# Patient Record
Sex: FEMALE | Race: WHITE | NOT HISPANIC OR LATINO | Employment: FULL TIME | URBAN - METROPOLITAN AREA
[De-identification: names, ages, dates, MRNs, and addresses within clinical notes are randomized per-mention and may not be internally consistent; named-entity substitution may affect disease eponyms.]

---

## 2018-12-18 ENCOUNTER — OFFICE VISIT CONVERTED (OUTPATIENT)
Dept: GASTROENTEROLOGY | Facility: CLINIC | Age: 18
End: 2018-12-18
Attending: PHYSICIAN ASSISTANT

## 2019-07-05 ENCOUNTER — HOSPITAL ENCOUNTER (OUTPATIENT)
Dept: OTHER | Facility: HOSPITAL | Age: 19
Discharge: HOME | End: 2019-07-05
Attending: INTERNAL MEDICINE

## 2019-07-05 LAB
BASOPHILS # BLD AUTO: 0.03 10*3/UL (ref 0–0.2)
BASOPHILS NFR BLD AUTO: 0.4 % (ref 0–3)
CONV ABS IMM GRAN: 0.01 10*3/UL (ref 0–0.2)
CONV IMMATURE GRAN: 0.1 % (ref 0–1.8)
DEPRECATED RDW RBC AUTO: 47.5 FL (ref 36.4–46.3)
EOSINOPHIL # BLD AUTO: 0.9 10*3/UL (ref 0–0.7)
EOSINOPHIL # BLD AUTO: 11 % (ref 0–7)
ERYTHROCYTE [DISTWIDTH] IN BLOOD BY AUTOMATED COUNT: 18.8 % (ref 11.7–14.4)
HBA1C MFR BLD: 9.3 G/DL (ref 12–16)
HCT VFR BLD AUTO: 35.1 % (ref 37–47)
IRON SATN MFR SERPL: 7 % (ref 20–55)
IRON SERPL-MCNC: 35 UG/DL (ref 60–170)
LYMPHOCYTES # BLD AUTO: 2.51 10*3/UL (ref 1–5)
MCH RBC QN AUTO: 18.7 PG (ref 27–31)
MCHC RBC AUTO-ENTMCNC: 26.5 G/DL (ref 33–37)
MCV RBC AUTO: 70.6 FL (ref 81–99)
MONOCYTES # BLD AUTO: 0.49 10*3/UL (ref 0.2–1.2)
MONOCYTES NFR BLD AUTO: 6 % (ref 3–10)
NEUTROPHILS # BLD AUTO: 4.25 10*3/UL (ref 2–8)
NEUTROPHILS NFR BLD AUTO: 51.9 % (ref 30–85)
NRBC CBCN: 0 % (ref 0–0.7)
PLATELET # BLD AUTO: 559 10*3/UL (ref 130–400)
PMV BLD AUTO: 9.9 FL (ref 9.4–12.3)
RBC # BLD AUTO: 4.97 10*6/UL (ref 4.2–5.4)
TIBC SERPL-MCNC: 535 UG/DL (ref 245–450)
TRANSFERRIN SERPL-MCNC: 374 MG/DL (ref 250–380)
VARIANT LYMPHS NFR BLD MANUAL: 30.6 % (ref 20–45)
WBC # BLD AUTO: 8.19 10*3/UL (ref 4.8–10.8)

## 2020-02-27 ENCOUNTER — HOSPITAL ENCOUNTER (OUTPATIENT)
Dept: OTHER | Facility: HOSPITAL | Age: 20
Discharge: HOME | End: 2020-02-27
Attending: PHYSICIAN ASSISTANT

## 2020-02-27 ENCOUNTER — OFFICE VISIT CONVERTED (OUTPATIENT)
Dept: GASTROENTEROLOGY | Facility: CLINIC | Age: 20
End: 2020-02-27
Attending: PHYSICIAN ASSISTANT

## 2020-02-27 LAB
ALBUMIN SERPL-MCNC: 4.1 G/DL (ref 3.5–5)
ALBUMIN/GLOB SERPL: 1.3 {RATIO} (ref 1.4–2.6)
ALP SERPL-CCNC: 81 U/L (ref 42–98)
ALT SERPL-CCNC: 18 U/L (ref 10–40)
ANION GAP SERPL CALC-SCNC: 16 MMOL/L (ref 8–19)
AST SERPL-CCNC: 17 U/L (ref 15–50)
BASOPHILS # BLD AUTO: 0.03 10*3/UL (ref 0–0.2)
BASOPHILS NFR BLD AUTO: 0.5 % (ref 0–3)
BILIRUB SERPL-MCNC: 0.41 MG/DL (ref 0.2–1.3)
BUN SERPL-MCNC: 9 MG/DL (ref 5–25)
BUN/CREAT SERPL: 11 {RATIO} (ref 6–20)
CALCIUM SERPL-MCNC: 8.8 MG/DL (ref 8.7–10.4)
CHLORIDE SERPL-SCNC: 103 MMOL/L (ref 99–111)
CONV ABS IMM GRAN: 0.01 10*3/UL (ref 0–0.2)
CONV CO2: 23 MMOL/L (ref 22–32)
CONV IMMATURE GRAN: 0.2 % (ref 0–1.8)
CONV TOTAL PROTEIN: 7.3 G/DL (ref 6.3–8.2)
CREAT UR-MCNC: 0.8 MG/DL (ref 0.5–0.9)
DEPRECATED RDW RBC AUTO: 40.6 FL (ref 36.4–46.3)
EOSINOPHIL # BLD AUTO: 0.2 10*3/UL (ref 0–0.7)
EOSINOPHIL # BLD AUTO: 3.6 % (ref 0–7)
ERYTHROCYTE [DISTWIDTH] IN BLOOD BY AUTOMATED COUNT: 13 % (ref 11.7–14.4)
GFR SERPLBLD BASED ON 1.73 SQ M-ARVRAT: >60 ML/MIN/{1.73_M2}
GLOBULIN UR ELPH-MCNC: 3.2 G/DL (ref 2–3.5)
GLUCOSE SERPL-MCNC: 97 MG/DL (ref 65–99)
HCT VFR BLD AUTO: 41.5 % (ref 37–47)
HGB BLD-MCNC: 13.3 G/DL (ref 12–16)
IRON SATN MFR SERPL: 9 % (ref 20–55)
IRON SERPL-MCNC: 41 UG/DL (ref 60–170)
LYMPHOCYTES # BLD AUTO: 2.11 10*3/UL (ref 1–5)
LYMPHOCYTES NFR BLD AUTO: 37.5 % (ref 20–45)
MCH RBC QN AUTO: 27.9 PG (ref 27–31)
MCHC RBC AUTO-ENTMCNC: 32 G/DL (ref 33–37)
MCV RBC AUTO: 87 FL (ref 81–99)
MONOCYTES # BLD AUTO: 0.21 10*3/UL (ref 0.2–1.2)
MONOCYTES NFR BLD AUTO: 3.7 % (ref 3–10)
NEUTROPHILS # BLD AUTO: 3.07 10*3/UL (ref 2–8)
NEUTROPHILS NFR BLD AUTO: 54.5 % (ref 30–85)
NRBC CBCN: 0 % (ref 0–0.7)
OSMOLALITY SERPL CALC.SUM OF ELEC: 285 MOSM/KG (ref 273–304)
PLATELET # BLD AUTO: 341 10*3/UL (ref 130–400)
PMV BLD AUTO: 10.4 FL (ref 9.4–12.3)
POTASSIUM SERPL-SCNC: 4 MMOL/L (ref 3.5–5.3)
RBC # BLD AUTO: 4.77 10*6/UL (ref 4.2–5.4)
SODIUM SERPL-SCNC: 138 MMOL/L (ref 135–147)
TIBC SERPL-MCNC: 433 UG/DL (ref 245–450)
TRANSFERRIN SERPL-MCNC: 303 MG/DL (ref 250–380)
WBC # BLD AUTO: 5.63 10*3/UL (ref 4.8–10.8)

## 2020-12-11 ENCOUNTER — HOSPITAL ENCOUNTER (OUTPATIENT)
Dept: FAMILY MEDICINE CLINIC | Facility: CLINIC | Age: 20
Discharge: HOME | End: 2020-12-11
Attending: NURSE PRACTITIONER

## 2020-12-11 ENCOUNTER — OFFICE VISIT CONVERTED (OUTPATIENT)
Dept: FAMILY MEDICINE CLINIC | Facility: CLINIC | Age: 20
End: 2020-12-11
Attending: NURSE PRACTITIONER

## 2020-12-11 LAB
ALBUMIN SERPL-MCNC: 4 G/DL (ref 3.5–5)
ALBUMIN/GLOB SERPL: 1 {RATIO} (ref 1.4–2.6)
ALP SERPL-CCNC: 96 U/L (ref 42–98)
ALT SERPL-CCNC: 18 U/L (ref 10–40)
ANION GAP SERPL CALC-SCNC: 13 MMOL/L (ref 8–19)
AST SERPL-CCNC: 20 U/L (ref 15–50)
BASOPHILS # BLD AUTO: 0.03 10*3/UL (ref 0–0.2)
BASOPHILS NFR BLD AUTO: 0.2 % (ref 0–3)
BILIRUB SERPL-MCNC: 0.37 MG/DL (ref 0.2–1.3)
BUN SERPL-MCNC: 8 MG/DL (ref 5–25)
BUN/CREAT SERPL: 10 {RATIO} (ref 6–20)
CALCIUM SERPL-MCNC: 9.3 MG/DL (ref 8.7–10.4)
CHLORIDE SERPL-SCNC: 102 MMOL/L (ref 99–111)
CONV ABS IMM GRAN: 0.06 10*3/UL (ref 0–0.2)
CONV CO2: 25 MMOL/L (ref 22–32)
CONV IMMATURE GRAN: 0.5 % (ref 0–1.8)
CONV TOTAL PROTEIN: 8.2 G/DL (ref 6.3–8.2)
CREAT UR-MCNC: 0.81 MG/DL (ref 0.5–0.9)
DEPRECATED RDW RBC AUTO: 46.1 FL (ref 36.4–46.3)
EOSINOPHIL # BLD AUTO: 1.62 10*3/UL (ref 0–0.7)
EOSINOPHIL # BLD AUTO: 12.9 % (ref 0–7)
ERYTHROCYTE [DISTWIDTH] IN BLOOD BY AUTOMATED COUNT: 13.9 % (ref 11.7–14.4)
FERRITIN SERPL-MCNC: 23 NG/ML (ref 10–200)
GFR SERPLBLD BASED ON 1.73 SQ M-ARVRAT: >60 ML/MIN/{1.73_M2}
GLOBULIN UR ELPH-MCNC: 4.2 G/DL (ref 2–3.5)
GLUCOSE SERPL-MCNC: 97 MG/DL (ref 65–99)
HCT VFR BLD AUTO: 41.7 % (ref 37–47)
HGB BLD-MCNC: 12.9 G/DL (ref 12–16)
IRON SATN MFR SERPL: 31 % (ref 20–55)
IRON SERPL-MCNC: 145 UG/DL (ref 60–170)
LYMPHOCYTES # BLD AUTO: 2.31 10*3/UL (ref 1–5)
LYMPHOCYTES NFR BLD AUTO: 18.4 % (ref 20–45)
MCH RBC QN AUTO: 28.2 PG (ref 27–31)
MCHC RBC AUTO-ENTMCNC: 30.9 G/DL (ref 33–37)
MCV RBC AUTO: 91 FL (ref 81–99)
MONOCYTES # BLD AUTO: 0.46 10*3/UL (ref 0.2–1.2)
MONOCYTES NFR BLD AUTO: 3.7 % (ref 3–10)
NEUTROPHILS # BLD AUTO: 8.07 10*3/UL (ref 2–8)
NEUTROPHILS NFR BLD AUTO: 64.3 % (ref 30–85)
NRBC CBCN: 0 % (ref 0–0.7)
OSMOLALITY SERPL CALC.SUM OF ELEC: 280 MOSM/KG (ref 273–304)
PLATELET # BLD AUTO: 453 10*3/UL (ref 130–400)
PMV BLD AUTO: 9.8 FL (ref 9.4–12.3)
POTASSIUM SERPL-SCNC: 4.4 MMOL/L (ref 3.5–5.3)
RBC # BLD AUTO: 4.58 10*6/UL (ref 4.2–5.4)
SODIUM SERPL-SCNC: 136 MMOL/L (ref 135–147)
T4 FREE SERPL-MCNC: 1.2 NG/DL (ref 0.9–1.8)
TIBC SERPL-MCNC: 469 UG/DL (ref 245–450)
TRANSFERRIN SERPL-MCNC: 328 MG/DL (ref 250–380)
TSH SERPL-ACNC: 4.53 M[IU]/L (ref 0.27–4.2)
WBC # BLD AUTO: 12.55 10*3/UL (ref 4.8–10.8)

## 2021-01-11 ENCOUNTER — TELEMEDICINE CONVERTED (OUTPATIENT)
Dept: FAMILY MEDICINE CLINIC | Facility: CLINIC | Age: 21
End: 2021-01-11
Attending: NURSE PRACTITIONER

## 2021-03-01 ENCOUNTER — CONVERSION ENCOUNTER (OUTPATIENT)
Dept: GASTROENTEROLOGY | Facility: CLINIC | Age: 21
End: 2021-03-01

## 2021-03-01 ENCOUNTER — OFFICE VISIT CONVERTED (OUTPATIENT)
Dept: GASTROENTEROLOGY | Facility: CLINIC | Age: 21
End: 2021-03-01
Attending: NURSE PRACTITIONER

## 2021-04-16 ENCOUNTER — HOSPITAL ENCOUNTER (OUTPATIENT)
Dept: GASTROENTEROLOGY | Facility: HOSPITAL | Age: 21
Setting detail: HOSPITAL OUTPATIENT SURGERY
Discharge: HOME | End: 2021-04-16
Attending: INTERNAL MEDICINE

## 2021-04-16 LAB — HCG UR QL: NEGATIVE

## 2021-05-01 ENCOUNTER — HOSPITAL ENCOUNTER (OUTPATIENT)
Dept: OTHER | Facility: HOSPITAL | Age: 21
Discharge: HOME | End: 2021-05-01
Attending: INTERNAL MEDICINE

## 2021-05-01 LAB
ALBUMIN SERPL-MCNC: 3.8 G/DL (ref 3.5–5)
ALBUMIN/GLOB SERPL: 1.2 {RATIO} (ref 1.4–2.6)
ALP SERPL-CCNC: 86 U/L (ref 42–98)
ALT SERPL-CCNC: 14 U/L (ref 10–40)
ANION GAP SERPL CALC-SCNC: 11 MMOL/L (ref 8–19)
AST SERPL-CCNC: 17 U/L (ref 15–50)
BASOPHILS # BLD AUTO: 0.02 10*3/UL (ref 0–0.2)
BASOPHILS NFR BLD AUTO: 0.2 % (ref 0–3)
BILIRUB SERPL-MCNC: 0.34 MG/DL (ref 0.2–1.3)
BUN SERPL-MCNC: 8 MG/DL (ref 5–25)
BUN/CREAT SERPL: 13 {RATIO} (ref 6–20)
CALCIUM SERPL-MCNC: 8.8 MG/DL (ref 8.7–10.4)
CHLORIDE SERPL-SCNC: 104 MMOL/L (ref 99–111)
CONV ABS IMM GRAN: 0.03 10*3/UL (ref 0–0.2)
CONV CO2: 22 MMOL/L (ref 22–32)
CONV IMMATURE GRAN: 0.3 % (ref 0–1.8)
CONV TOTAL PROTEIN: 7.1 G/DL (ref 6.3–8.2)
CREAT UR-MCNC: 0.62 MG/DL (ref 0.5–0.9)
DEPRECATED RDW RBC AUTO: 41.8 FL (ref 36.4–46.3)
EOSINOPHIL # BLD AUTO: 0.57 10*3/UL (ref 0–0.7)
EOSINOPHIL # BLD AUTO: 6.3 % (ref 0–7)
ERYTHROCYTE [DISTWIDTH] IN BLOOD BY AUTOMATED COUNT: 13.3 % (ref 11.7–14.4)
GFR SERPLBLD BASED ON 1.73 SQ M-ARVRAT: >60 ML/MIN/{1.73_M2}
GLOBULIN UR ELPH-MCNC: 3.3 G/DL (ref 2–3.5)
GLUCOSE SERPL-MCNC: 95 MG/DL (ref 65–99)
HCT VFR BLD AUTO: 40.5 % (ref 37–47)
HGB BLD-MCNC: 13.4 G/DL (ref 12–16)
LYMPHOCYTES # BLD AUTO: 1.96 10*3/UL (ref 1–5)
LYMPHOCYTES NFR BLD AUTO: 21.8 % (ref 20–45)
MCH RBC QN AUTO: 28.7 PG (ref 27–31)
MCHC RBC AUTO-ENTMCNC: 33.1 G/DL (ref 33–37)
MCV RBC AUTO: 86.7 FL (ref 81–99)
MONOCYTES # BLD AUTO: 0.41 10*3/UL (ref 0.2–1.2)
MONOCYTES NFR BLD AUTO: 4.6 % (ref 3–10)
NEUTROPHILS # BLD AUTO: 6 10*3/UL (ref 2–8)
NEUTROPHILS NFR BLD AUTO: 66.8 % (ref 30–85)
NRBC CBCN: 0 % (ref 0–0.7)
OSMOLALITY SERPL CALC.SUM OF ELEC: 274 MOSM/KG (ref 273–304)
PLATELET # BLD AUTO: 371 10*3/UL (ref 130–400)
PMV BLD AUTO: 9.5 FL (ref 9.4–12.3)
POTASSIUM SERPL-SCNC: 4.4 MMOL/L (ref 3.5–5.3)
RBC # BLD AUTO: 4.67 10*6/UL (ref 4.2–5.4)
SODIUM SERPL-SCNC: 133 MMOL/L (ref 135–147)
WBC # BLD AUTO: 8.99 10*3/UL (ref 4.8–10.8)

## 2021-05-10 NOTE — H&P
History and Physical      Patient Name: Lyn Darling   Patient ID: [id number]   Sex: Female   YOB: 2000    Primary Care Provider: Marilia Duffy MD    Visit Date: December 11, 2020    Provider: CELIA Lester   Location: [facility]   Location Address: [address] [place] [address], [address] [place] [address]  [id number]   Location Phone: [phone]          Chief Complaint  · new pt  · wants to talk about anxiety      History Of Present Illness  Lyn Darling is a 20 year old /White female who presents for evaluation and treatment of:      She is here as a new patient today to establish care.  She would like to talk about anxiety.  She has a history of anxiety and states she had been on Zoloft in the past when she was a teenager.  She states she did well with it.  She is having more anxiety lately and would like to restart some medication.    She has a history of ulcerative colitis: She follows with Dr. Guzman, gastroenterology and is stable on mesalamine.    She states she has a history of iron deficiency anemia so she takes over-the-counter iron tablet daily.  She states she was told to continue taking it daily.    She has history of allergies and takes Allegra.       Past Medical History  Disease Name Date Onset Notes   Anemia --  --    Anxiety --  --    Colitis --  --    Diarrhea --  --    Eczema --  --    Kidney stones --  --    Ulcerative colitis --  --          Past Surgical History  Procedure Name Date Notes   Colonoscopy 2015 --          Medication List  Name Date Started Instructions   Allegra Allergy oral  --    iron oral  take 1 by oral route daily   mesalamine 0.375 gram oral capsule,extended release 24hr 08/19/2020 TAKE FOUR CAPSULES BY MOUTH EVERY MORNING         Allergy List  Allergen Name Date Reaction Notes   NO KNOWN DRUG ALLERGIES --  --  --          Family Medical History  Disease Name Relative/Age Notes   Colon Neoplasm, Malignant Grandfather  "(maternal)/60   --          Social History  Finding Status Start/Stop Quantity Notes   Alcohol Never --/-- --  --    Tobacco Never --/-- --  --          Review of Systems  · Constitutional  o Denies  o : fever, fatigue, weight loss, weight gain  · Cardiovascular  o Denies  o : lower extremity edema, claudication, chest pressure, palpitations  · Respiratory  o Denies  o : shortness of breath, wheezing, cough, hemoptysis, dyspnea on exertion  · Gastrointestinal  o Denies  o : nausea, vomiting, constipation  · Genitourinary  o Denies  o : urgency, frequency  · Integument  o Denies  o : rash, itching  · Neurologic  o Denies  o : altered mental status, tingling or numbness  · Musculoskeletal  o Denies  o : joint pain, joint swelling  · Endocrine  o Denies  o : polyuria, polydipsia, cold intolerance  · Psychiatric  o Admits  o : anxiety  o Denies  o : depression, suicidal ideation, homicidal ideation  · Allergic-Immunologic  o Admits  o : sinus allergy symptoms  o Denies  o : frequent illnesses      Vitals  Date Time BP Position Site L\R Cuff Size HR RR TEMP (F) WT  HT  BMI kg/m2 BSA m2 O2 Sat FR L/min FiO2 HC       12/11/2020 08:24 /62 Sitting    105 - R  98.1 200lbs 0oz 5'  5\" 33.28 2.04 99 %            Physical Examination  · Constitutional  o Appearance  o : no acute distress, well-nourished  · Head and Face  o Head  o :   § Inspection  § : atraumatic, normocephalic  · Neck  o Thyroid  o : gland size normal, nontender, no nodules or masses present on palpation, symmetric  · Respiratory  o Respiratory Effort  o : breathing comfortably, symmetric chest rise  o Auscultation of Lungs  o : clear to asculatation bilaterally, no wheezes, rales, or rhonchii  · Cardiovascular  o Heart  o :   § Auscultation of Heart  § : regular rate and rhythm, no murmurs, rubs, or gallops  o Peripheral Vascular System  o :   § Extremities  § : no edema  · Lymphatic  o Neck  o : no lymphadenopathy present  · Neurologic  o Mental Status " Examination  o :   § Orientation  § : grossly oriented to person, place and time  o Gait and Station  o :   § Gait Screening  § : normal gait  · Psychiatric  o Mood and Affect  o : mood normal, affect appropriate  o Presence of Abnormal Thoughts  o : no hallucinations, no delusions present, no psychotic thoughts, no homicidal ideation, no suicidal ideation, no evidence of obsessional thinking          Assessment  · Iron deficiency anemia, unspecified iron deficiency anemia type     280.9/D50.9  · Anxiety disorder     300.00/F41.9  We will start her on Zoloft 50 mg once daily, advised to start with a half a tablet daily for 1 week and then increase to full tablet. I will have her follow-up in a month see how she is doing and see if we need to adjust dose.  · Ulcerative colitis     556.9/K51.90  We will check labs today, will call with results. We will also send results to Dr. Cottrell.      Plan  · Orders  o CBC with Auto Diff [facility] (06219) - 300.00/F41.9, 556.9/K51.90, 280.9/D50.9 - 12/11/2020   send results to Dr. MARTHA Guzman  o Iron panel (iron, TIBC, transferrin saturation) (01265, 45435, 61109) - 300.00/F41.9, 556.9/K51.90, 280.9/D50.9 - 12/11/2020  o Ferritin ser/plas (24189) - 300.00/F41.9, 556.9/K51.90, 280.9/D50.9 - 12/11/2020  o CMP [facility] (47090) - 300.00/F41.9, 556.9/K51.90, 280.9/D50.9 - 12/11/2020  o Thyroid Profile (58144, THYII, 73944) - 300.00/F41.9, 556.9/K51.90, 280.9/D50.9 - 12/11/2020  o ACO-39: Current medications updated and reviewed (, 1159F) - - 12/11/2020  o ACO-18: Negative screen for clinical depression using a standardized tool () - - 12/11/2020   4 pts  · Medications  o Zoloft 50 mg oral tablet   SIG: take 1 tablet (50 mg) by oral route once daily for 30 days   DISP: (30) Tablet with 5 refills  Prescribed on 12/11/2020     o Medications have been Reconciled  o Transition of Care or Provider Policy  · Instructions  o Patient was given an SSRI/SSNRI medication and warned of possible  side effects of the medication including potential for increased risk of suicidal thoughts and feelings. Patient was instructed that if they begin to exhibit any of these effects they will discontinue the medication immediately and contact our office or the ER ASAP.  o Patient was educated/instructed on their diagnosis, treatment and medications prior to discharge from the clinic today.  o Patient instructed to seek medical attention urgently for new or worsening symptoms.  o Call the office with any concerns or questions.  · Disposition  o Return to clinic in 4 weeks            Electronically Signed by: CELIA Lester -Author on December 11, 2020 09:16:17 AM

## 2021-05-14 VITALS
HEIGHT: 65 IN | HEART RATE: 105 BPM | BODY MASS INDEX: 33.32 KG/M2 | TEMPERATURE: 98.1 F | SYSTOLIC BLOOD PRESSURE: 124 MMHG | WEIGHT: 200 LBS | DIASTOLIC BLOOD PRESSURE: 62 MMHG | OXYGEN SATURATION: 99 %

## 2021-05-14 VITALS
DIASTOLIC BLOOD PRESSURE: 68 MMHG | BODY MASS INDEX: 32.99 KG/M2 | RESPIRATION RATE: 16 BRPM | WEIGHT: 198 LBS | HEART RATE: 98 BPM | OXYGEN SATURATION: 100 % | SYSTOLIC BLOOD PRESSURE: 122 MMHG | HEIGHT: 65 IN

## 2021-05-14 NOTE — PROGRESS NOTES
Progress Note      Patient Name: Lyn Darling   Patient ID: [id number]   Sex: Female   YOB: 2000    Primary Care Provider: Lidia YANG   Referring Provider: Lidia YANG    Visit Date: March 1, 2021    Provider: CELIA VASQUEZ   Location: [facility]   Location Address: [address] [place] [address]  [id number]   Location Phone: [phone]          Chief Complaint  · Follow-up      History Of Present Illness     20-year-old female with a history of pan ulcerative colitis presents to the office today for follow-up.  Patient was diagnosed with pan ulcerative colitis in 2015, she follows up annually.  Her ulcerative colitis has been maintained with Apriso 4 tabs daily.  She denies any rectal bleeding, abdominal pain, nausea, weight loss.  Patient reports that she did have a flare about 3 to 4 months ago that lasted about a week and resolved on its own.  Take any steroids.  Patient reports she is having 2 normal bowel movements a day and has not seen any blood or mucus.    Reviewed the patient's most recent colonoscopy 10/23/2015 performed by Dr. Guzman revealed moderately severe colitis.  Patient was not started on Apriso 0.375 g 4 tabs daily.  Biopsies were consistent with chronic colitis.     Labs: Review of labs 12/11/2020 revealed normal CMP, WBC12.55, RBC5.58, Hgb12.9, uhqkwjlnp686, eosinophils1.62, ydfb387, PKHW546, TSH4.53         Past Medical History  Anemia; Anxiety; Anxiety disorder; Eczema; Iron deficiency anemia, unspecified iron deficiency anemia type; Kidney stones; Ulcerative colitis         Past Surgical History  Colonoscopy         Medication List  Allegra Allergy oral; iron oral; mesalamine 0.375 gram oral capsule,extended release 24hr; Ortho Tri-Cyclen (28) 0.18/0.215/0.25 mg-35 mcg (28) oral tablet; Zoloft 50 mg oral tablet         Allergy List  NO KNOWN DRUG ALLERGIES         Family Medical History  Colon Neoplasm,  "Malignant         Social History  Alcohol (Never); Tobacco (Never)         Review of Systems  · Constitutional  o Denies  o : chills, fatigue, fever, loss of appetite, weakness, weight loss  · Cardiovascular  o Denies  o : chest pain  · Respiratory  o Denies  o : shortness of breath  · Gastrointestinal  o Denies  o : abdominal pain, blood in stools, bowel changes, constipation, diarrhea, heartburn, nausea, reflux, vomiting, dysphagia  · Endocrine  o Denies  o : weight gain, weight loss      Vitals  Date Time BP Position Site L\R Cuff Size HR RR TEMP (F) WT  HT  BMI kg/m2 BSA m2 O2 Sat FR L/min FiO2 HC       02/27/2020 08:08 /64 Sitting    82 - R 16  187lbs 0oz 5'  5\" 31.12 1.97 100 %      12/11/2020 08:24 /62 Sitting    105 - R  98.1 200lbs 0oz 5'  5\" 33.28 2.04 99 %      03/01/2021 09:15 /68 Sitting    98 - R 16  198lbs 0oz 5'  5\" 32.95 2.03 100 %            Physical Examination  · Constitutional  o Appearance  o : Healthy-appearing, awake and alert in no acute distress  · Head and Face  o Head  o : Normocephalic with no worriesome skin lesions  · Eyes  o Vision  o :   § Visual Fields  § : eyes move symmetrical in all directions  o Sclerae  o : sclerae anicteric  · Neck  o Inspection/Palpation  o : Trachea is midline, no adenopathy  · Respiratory  o Respiratory Effort  o : Breathing is unlabored.  o Inspection of Chest  o : normal appearance  o Auscultation of Lungs  o : Chest is clear to auscultation bilaterally.  · Cardiovascular  o Heart  o :   § Auscultation of Heart  § : no murmurs, rubs, or gallops, regular rate and rhythm  o Peripheral Vascular System  o :   § Extremities  § : no cyanosis, clubbing or edema;   · Gastrointestinal  o Abdominal Examination  o : Abdomen is soft, nontender to palpation, with normal active bowel sounds, no appreciable hepatosplenomegaly.          Assessment  · Ulcerative Colitis     556.9/K51.90      Plan  · Orders  o Consent for Colonoscopy Screening -Possible " risk/complications, benefits, and alternatives to surgical or invasive procedure have been explained to patient and/or legal gaurdian. -Patient has been evaluated and can tolerate anesthesia and/or sedation. Risks, benefits, and alternatives to anesthesia and sedation have been explained to patient and/or legal gaurdian. () - - 03/01/2021  o e-prescribe - at least one () - - 03/01/2021  · Medications  o mesalamine 0.375 gram oral capsule,extended release 24hr   SIG: TAKE FOUR CAPSULES BY MOUTH EVERY MORNING   DISP: (120) Capsule with 3 refills  Refilled on 03/01/2021     · Instructions  o 20-year-old female with a history of pan ulcerative colitis presents the office for follow-up today. Patient is doing really well and is maintained on Apriso 4 tabs daily. Patient has not had a colonoscopy since 2015 since diagnosis of colitis. I recommend the patient have a repeat screening colonoscopy due to having ulcerative colitis. I have educated the patient on risk and benefits of the procedure. I have informed the patient that due to having ulcerative colitis this puts her at an increased risk for colon cancer. Patient is agreeable to colonoscopy. We will continue patient's Apriso, I have sent in refills, and 1 year follow-up. Patient to call with any questions or concerns.  · Disposition  o Call or Return if symptoms worsen or persist.  o Meds sent to pharmacy            Electronically Signed by: CELIA VASQUEZ -Author on March 1, 2021 09:27:21 AM  Electronically Co-signed by: Leno Guzman MD -Reviewer on March 8, 2021 09:02:26 AM

## 2021-05-14 NOTE — PROGRESS NOTES
Progress Note      Patient Name: Lyn Darling   Patient ID: [id number]   Sex: Female   YOB: 2000    Primary Care Provider: Lidia YANG   Referring Provider: Lidia YANG    Visit Date: January 11, 2021    Provider: CELIA Lester   Location: [facility]   Location Address: [address] [place] [address], [address]  [id number]   Location Phone: [phone]          Chief Complaint  · Follow-up on anxiety      History Of Present Illness  Video Conferencing Visit  Lyn Darling is a 20 year old /White female who is presenting for evaluation via video conferencing via [place]. Verbal consent obtained before beginning visit.   The following staff were present during this visit: CELIA Pan.   Lyn Darling is a 20 year old /White female who presents for evaluation and treatment of:      1 month follow-up on anxiety after starting on Zoloft.  She states she is feeling much better and feels that this dose is therapeutic.       Review of Systems  · Constitutional  o Denies  o : fever, fatigue, weight loss, weight gain  · Cardiovascular  o Denies  o : lower extremity edema, claudication, chest pressure, palpitations  · Respiratory  o Denies  o : shortness of breath, wheezing, cough, hemoptysis, dyspnea on exertion  · Gastrointestinal  o Denies  o : nausea, vomiting, diarrhea, constipation, abdominal pain  · Psychiatric  o Denies  o : anxiety, depression, suicidal ideation, homicidal ideation      Physical Examination  · Constitutional  o Appearance  o : no acute distress, well-nourished  · Head and Face  o Head  o :   § Inspection  § : atraumatic, normocephalic  · Respiratory  o Respiratory Effort  o : breathing comfortably, symmetric chest rise  · Neurologic  o Mental Status Examination  o :   § Orientation  § : grossly oriented to person, place and time  · Psychiatric  o General  o : normal mood and  affect  o Presence of Abnormal Thoughts  o : no hallucinations, no delusions present, no psychotic thoughts, no homicidal ideation, no suicidal ideation, no evidence of obsessional thinking          Assessment  · Anxiety disorder     300.00/F41.9      Plan  · Orders  o ACO-39: Current medications updated and reviewed (1159F, ) - - 01/11/2021  · Instructions  o Patient was educated/instructed on their diagnosis, treatment and medications today.  o Patient instructed to seek medical attention urgently for new or worsening symptoms.  o Call the office with any concerns or questions.  · Disposition  o Return to clinic in 6 months            Electronically Signed by: CELIA Lester -Author on January 11, 2021 11:42:30 AM

## 2021-05-15 VITALS
BODY MASS INDEX: 30.16 KG/M2 | HEART RATE: 93 BPM | HEIGHT: 65 IN | OXYGEN SATURATION: 100 % | DIASTOLIC BLOOD PRESSURE: 65 MMHG | SYSTOLIC BLOOD PRESSURE: 127 MMHG | WEIGHT: 181 LBS

## 2021-05-15 VITALS
HEART RATE: 82 BPM | BODY MASS INDEX: 31.16 KG/M2 | HEIGHT: 65 IN | DIASTOLIC BLOOD PRESSURE: 64 MMHG | OXYGEN SATURATION: 100 % | WEIGHT: 187 LBS | SYSTOLIC BLOOD PRESSURE: 140 MMHG | RESPIRATION RATE: 16 BRPM

## 2021-05-22 ENCOUNTER — HOSPITAL ENCOUNTER (OUTPATIENT)
Dept: OTHER | Facility: HOSPITAL | Age: 21
Discharge: HOME | End: 2021-05-22
Attending: INTERNAL MEDICINE

## 2021-05-22 LAB
ALBUMIN SERPL-MCNC: 3.7 G/DL (ref 3.5–5)
ALBUMIN/GLOB SERPL: 0.8 {RATIO} (ref 1.4–2.6)
ALP SERPL-CCNC: 98 U/L (ref 42–98)
ALT SERPL-CCNC: 14 U/L (ref 10–40)
ANION GAP SERPL CALC-SCNC: 12 MMOL/L (ref 8–19)
AST SERPL-CCNC: 15 U/L (ref 15–50)
BASOPHILS # BLD AUTO: 0.02 10*3/UL (ref 0–0.2)
BASOPHILS NFR BLD AUTO: 0.3 % (ref 0–3)
BILIRUB SERPL-MCNC: 0.22 MG/DL (ref 0.2–1.3)
BUN SERPL-MCNC: 8 MG/DL (ref 5–25)
BUN/CREAT SERPL: 11 {RATIO} (ref 6–20)
CALCIUM SERPL-MCNC: 9 MG/DL (ref 8.7–10.4)
CHLORIDE SERPL-SCNC: 102 MMOL/L (ref 99–111)
CONV ABS IMM GRAN: 0.01 10*3/UL (ref 0–0.2)
CONV CO2: 24 MMOL/L (ref 22–32)
CONV IMMATURE GRAN: 0.2 % (ref 0–1.8)
CONV TOTAL PROTEIN: 8.1 G/DL (ref 6.3–8.2)
CREAT UR-MCNC: 0.71 MG/DL (ref 0.5–0.9)
DEPRECATED RDW RBC AUTO: 42.7 FL (ref 36.4–46.3)
EOSINOPHIL # BLD AUTO: 0.52 10*3/UL (ref 0–0.7)
EOSINOPHIL # BLD AUTO: 8 % (ref 0–7)
ERYTHROCYTE [DISTWIDTH] IN BLOOD BY AUTOMATED COUNT: 13.2 % (ref 11.7–14.4)
GFR SERPLBLD BASED ON 1.73 SQ M-ARVRAT: >60 ML/MIN/{1.73_M2}
GLOBULIN UR ELPH-MCNC: 4.4 G/DL (ref 2–3.5)
GLUCOSE SERPL-MCNC: 90 MG/DL (ref 65–99)
HCT VFR BLD AUTO: 42.3 % (ref 37–47)
HGB BLD-MCNC: 13.8 G/DL (ref 12–16)
LYMPHOCYTES # BLD AUTO: 2 10*3/UL (ref 1–5)
LYMPHOCYTES NFR BLD AUTO: 30.7 % (ref 20–45)
MCH RBC QN AUTO: 28.7 PG (ref 27–31)
MCHC RBC AUTO-ENTMCNC: 32.6 G/DL (ref 33–37)
MCV RBC AUTO: 87.9 FL (ref 81–99)
MONOCYTES # BLD AUTO: 0.25 10*3/UL (ref 0.2–1.2)
MONOCYTES NFR BLD AUTO: 3.8 % (ref 3–10)
NEUTROPHILS # BLD AUTO: 3.71 10*3/UL (ref 2–8)
NEUTROPHILS NFR BLD AUTO: 57 % (ref 30–85)
NRBC CBCN: 0 % (ref 0–0.7)
OSMOLALITY SERPL CALC.SUM OF ELEC: 276 MOSM/KG (ref 273–304)
PLATELET # BLD AUTO: 370 10*3/UL (ref 130–400)
PMV BLD AUTO: 9.7 FL (ref 9.4–12.3)
POTASSIUM SERPL-SCNC: 4.1 MMOL/L (ref 3.5–5.3)
RBC # BLD AUTO: 4.81 10*6/UL (ref 4.2–5.4)
SODIUM SERPL-SCNC: 134 MMOL/L (ref 135–147)
WBC # BLD AUTO: 6.51 10*3/UL (ref 4.8–10.8)

## 2021-07-08 ENCOUNTER — OFFICE VISIT (OUTPATIENT)
Dept: GASTROENTEROLOGY | Facility: CLINIC | Age: 21
End: 2021-07-08

## 2021-07-08 VITALS
HEIGHT: 65 IN | SYSTOLIC BLOOD PRESSURE: 113 MMHG | DIASTOLIC BLOOD PRESSURE: 61 MMHG | RESPIRATION RATE: 18 BRPM | BODY MASS INDEX: 32.32 KG/M2 | WEIGHT: 194 LBS | HEART RATE: 103 BPM | OXYGEN SATURATION: 100 %

## 2021-07-08 DIAGNOSIS — K51.00 CHRONIC PANCOLONIC ULCERATIVE COLITIS (HCC): Primary | ICD-10-CM

## 2021-07-08 PROBLEM — D50.9 IRON DEFICIENCY ANEMIA: Status: ACTIVE | Noted: 2020-12-11

## 2021-07-08 PROBLEM — L30.9 ECZEMA: Status: ACTIVE | Noted: 2021-07-08

## 2021-07-08 PROBLEM — N20.0 KIDNEY STONES: Status: ACTIVE | Noted: 2021-07-08

## 2021-07-08 PROBLEM — D64.9 ANEMIA: Status: ACTIVE | Noted: 2021-07-08

## 2021-07-08 PROCEDURE — 99213 OFFICE O/P EST LOW 20 MIN: CPT | Performed by: NURSE PRACTITIONER

## 2021-07-08 RX ORDER — MESALAMINE 0.38 G/1
CAPSULE, EXTENDED RELEASE ORAL
COMMUNITY
Start: 2021-06-17 | End: 2021-07-08 | Stop reason: SDUPTHER

## 2021-07-08 RX ORDER — FEXOFENADINE HCL 180 MG/1
TABLET ORAL
COMMUNITY
End: 2022-11-07

## 2021-07-08 RX ORDER — MESALAMINE 0.38 G/1
CAPSULE, EXTENDED RELEASE ORAL
Qty: 120 CAPSULE | Refills: 5 | Status: SHIPPED | OUTPATIENT
Start: 2021-07-08 | End: 2022-01-10 | Stop reason: SDUPTHER

## 2021-07-08 RX ORDER — AZATHIOPRINE 50 MG/1
50 TABLET ORAL DAILY
Qty: 30 TABLET | Refills: 5 | Status: SHIPPED | OUTPATIENT
Start: 2021-07-08 | End: 2022-01-10 | Stop reason: SDUPTHER

## 2021-07-08 RX ORDER — LEVONORGESTREL AND ETHINYL ESTRADIOL 0.1-0.02MG
1 KIT ORAL DAILY
COMMUNITY
Start: 2021-06-29 | End: 2022-02-09

## 2021-07-08 RX ORDER — AZATHIOPRINE 50 MG/1
50 TABLET ORAL DAILY
COMMUNITY
Start: 2021-06-17 | End: 2021-07-08 | Stop reason: SDUPTHER

## 2021-07-08 NOTE — PATIENT INSTRUCTIONS
Crohn's Disease    Crohn's disease is a long-lasting (chronic) disease that affects the gastrointestinal (GI) tract. Crohn's disease often causes irritation and inflammation in the small intestine and the beginning of the large intestine, but it can affect any part of the GI tract. Crohn's disease is part of a group of illnesses that are known as inflammatory bowel disease (IBD).  Crohn's disease may start slowly and get worse over time. Symptoms may come and go. They may also go away for months or even years at a time (remission).  What are the causes?  The exact cause of this condition is not known. It may involve a response that causes your body's disease-fighting (immune) system to attack healthy cells and tissues (autoimmune response). Bacteria, genes, and your environment may also play a role.  What increases the risk?  The following factors may make you more likely to develop this condition:  · Having a family member who has Crohn's disease, another IBD, or an autoimmune condition.  · Using products that contain nicotine or tobacco, such as cigarettes and e-cigarettes.  · Being in your 20s.  · Having Eastern  ancestry.  What are the signs or symptoms?  The main symptoms of this condition involve your GI tract. These include:  · Diarrhea.  · Pain or cramping in the abdomen. This is commonly felt in the lower right side of the abdomen.  · Frequent watery or bloody stools.  · Constipation. This may mean having:  ? Fewer bowel movements in a week than normal.  ? Difficulty having a bowel movement.  ? Stools that are dry, hard, or larger than normal.  · Rectal bleeding.  · Rectal pain.  · An urgent need to have a bowel movement.  · The feeling that you are not finished having a bowel movement.  Other symptoms may include:  · Unexplained weight loss.  · Fatigue.  · Fever.  · Nausea.  · Loss of appetite.  · Joint pain.  · Vision changes.  · Red bumps or sores on the skin.  · Sores inside the mouth.  How is  this diagnosed?  This condition may be diagnosed based on:  · Your symptoms and your medical history.  · A physical exam.  · Tests, which may include:  ? Blood tests.  ? Stool sample tests.  ? Imaging tests, such as X-rays and CT scans.  ? Tests to examine the inside of your intestines using a long, flexible tube that has a light and a camera on the end (endoscopy or colonoscopy).  ? A procedure to remove tissue samples from inside your bowel for testing (biopsy).  You may need to work with a health care provider who specializes in diseases of the digestive tract (gastroenterologist).  How is this treated?  There is no cure for this condition, but treatment can help you manage your symptoms. Crohn's disease affects each person differently. Your treatment may include:  · Resting your bowels. This involves having a period of healing time when your bowels are not passing stools. This may be done by:  ? Drinking only clear liquids. These are liquids that you can see through, such as water, black coffee, fruit juice without pulp, broth, gelatin, and ice pops.  ? Getting nutrition through an IV for a period of time.  · Medicines. These may be used by themselves or with other treatments (combination therapy). These may include antibiotic medicines. You may be given medicines that help to:  ? Reduce inflammation.  ? Control your immune system activity.  ? Fight infections.  ? Relieve cramps and prevent diarrhea.  ? Control your pain.  · Surgery. You may need surgery if:  ? Medicines and other treatments are not working anymore.  ? You develop complications from severe Crohn's disease.  ? A section of your intestine becomes so damaged that it needs to be removed.  Follow these instructions at home:  Medicines  · Take over-the-counter and prescription medicines only as told by your health care provider.  · If you were prescribed an antibiotic, take it as told by your health care provider. Do not stop taking the antibiotic  even if you start to feel better.  · Avoid taking ibuprofen or other NSAID medicines if possible, these can make Crohn's disease worse.  Eating and drinking  · Talk with your health care provider or a diet and nutrition specialist (registered dietitian) about what diet is best for you.  · Drink enough fluid to keep your urine pale yellow.  · If you are taking steroids to reduce inflammation, get plenty of calcium in your diet to help keep your bones healthy. You may also consider taking a calcium supplement with vitamin D.  · Keep a food diary to identify foods that make your symptoms better or worse.  · Avoid foods that cause symptoms.  · Follow instructions from your health care provider about eating or drinking restrictions if you have worsening symptoms (flare-up).  · Limit alcohol intake to no more than 1 drink a day for nonpregnant women and 2 drinks a day for men. One drink equals 12 oz of beer, 5 oz of wine, or 1½ oz of hard liquor.  General instructions  · Make sure you get all the vaccines that your health care provider recommends, especially pneumonia (pneumococcal) and flu (influenza) vaccines.  · Do not use any products that contain nicotine or tobacco, such as cigarettes and e-cigarettes. If you need help quitting, ask your health care provider.  · Exercise every day, or as often told by your health care provider.  · Keep all follow-up visits as told by your health care provider. This is important.  Contact a health care provider if:  · You have diarrhea, cramps in your abdomen, and other GI problems that are present almost all the time.  · Your symptoms do not improve with treatment.  · You continue to lose weight.  · You develop a rash or sores on your skin.  · You develop eye problems.  · You have a fever.  · Your symptoms get worse.  · You develop new symptoms.  Get help right away if:  · You have bloody diarrhea.  · You have severe pain in your abdomen.  · You cannot pass  stools.  Summary  · Crohn's disease affects each person differently. There are multiple treatment options that can help you manage the condition.  · Talk with your health care provider or diet and nutrition specialist (registered dietitian) about what diet is best for you.  · Make sure you get all the vaccines that your health care provider recommends, especially pneumonia (pneumococcal) and flu (influenza) vaccines.  This information is not intended to replace advice given to you by your health care provider. Make sure you discuss any questions you have with your health care provider.  Document Revised: 11/30/2018 Document Reviewed: 08/20/2018  Elsevier Patient Education © 2021 [employer]

## 2021-07-08 NOTE — PROGRESS NOTES
Chief Complaint  Follow-up (colonoscopy )    History of Present Illness  Lyn Darling is a 21 y.o. female who presents to [facility] for follow-up with a history of ulcerative colitis.  Patient continues on Apriso 0.375 g take 4 capsules once daily.  She is also taking azathioprine 50 mg daily.  Patient reports that she is doing really well she is having 2-3 bowel movements a day.  She denies any abdominal pain or rectal bleeding.  Patient denies fever, nausea, vomiting, weight loss, night sweats, melena, hematochezia, hematemesis.  She reports that she can tell if she eats something that her body does not agree with such as greasy food.     Colonoscopy: Review of the patient's most recent colonoscopy performed by Dr. Guzman on 4/16/2021 displays diffuse ulceration granularity and erythema in the whole colon compatible with mild to moderate ulcerative colitis.  Patient was began on azathioprine and continued on Apriso.  CBC and CMP was ordered for 2 weeks 1 month and a repeat colonoscopy in April 2023.  Pathology with mild crypt architectural distortion and eosinophilic inflammation.     Labs: WBC-6.51, RBC-4.81, hemoglobin 13.8, hematocrit 42.3, CMP Creatinine normal, ALT AST normal.    @[facility]@    Past Medical History:   Diagnosis Date   • Anxiety 12/11/2020   • Eczema    • Iron deficiency anemia, unspecified 12/11/2020   • Kidney stones    • Ulcerative colitis (CMS/HCC) 12/11/2020       Past Surgical History:   Procedure Laterality Date   • COLONOSCOPY  2015 2021         Current Outpatient Medications:   •  azaTHIOprine (IMURAN) 50 MG tablet, Take 1 tablet by mouth Daily., Disp: 30 tablet, Rfl: 5  •  dextromethorphan-guaifenesin (MUCINEX DM)  MG per 12 hr tablet, Take 1 tablet by mouth Every 12 (Twelve) Hours., Disp: , Rfl:   •  fexofenadine (Allegra Allergy) 180 MG tablet, , Disp: , Rfl:   •  levonorgestrel-ethinyl estradiol (AVIANE,ALESSE,LESSINA) 0.1-20 MG-MCG  "per tablet, Take 1 tablet by mouth Daily., Disp: , Rfl:   •  mesalamine (APRISO) 0.375 g 24 hr capsule, Take 4 capsules by mouth every morning, Disp: 120 capsule, Rfl: 5  •  sertraline (ZOLOFT) 50 MG tablet, Take 1 tablet by mouth Daily., Disp: 30 tablet, Rfl: 0  •  sertraline (Zoloft) 50 MG tablet, Zoloft 50 mg oral tablet take 1 tablet (50 mg) by oral route once daily   Suspended, Disp: , Rfl:      No Known Allergies    Family History   Problem Relation Age of Onset   • Colon cancer Maternal Grandfather    • Breast cancer Maternal Grandmother         Social History     Social History Narrative   • Not on file       Objective     Review of Systems   Constitutional: Negative for fatigue, fever, unexpected weight gain and unexpected weight loss.   Gastrointestinal: Negative for abdominal distention, abdominal pain, anal bleeding, blood in stool, constipation, diarrhea, nausea, rectal pain, vomiting, GERD and indigestion.        Vital Signs:   /61 (BP Location: Left arm, Patient Position: Sitting, Cuff Size: Adult)   Pulse 103   Resp 18   Ht 165.1 cm (65\")   Wt 88 kg (194 lb)   SpO2 100% Comment: room air  BMI 32.28 kg/m²       Physical Exam  Constitutional:       General: She is not in acute distress.     Appearance: Normal appearance.   HENT:      Head: Normocephalic.   Eyes:      Conjunctiva/sclera: Conjunctivae normal.      Pupils: Pupils are equal, round, and reactive to light.      Visual Fields: Right eye visual fields normal and left eye visual fields normal.   Neck:      Trachea: Trachea normal.   Cardiovascular:      Rate and Rhythm: Normal rate and regular rhythm.      Heart sounds: Normal heart sounds.   Pulmonary:      Effort: Pulmonary effort is normal.      Breath sounds: Normal breath sounds and air entry.      Comments: Inspection of chest: normal appearance  Abdominal:      General: Abdomen is flat. Bowel sounds are normal.      Palpations: Abdomen is soft. There is no mass.      " Tenderness: There is no guarding.   Musculoskeletal:      Right lower leg: No edema.      Left lower leg: No edema.   Skin:     Findings: No lesion.      Comments: Turgor is normal   Neurological:      Mental Status: She is alert and oriented to person, place, and time.   Psychiatric:         Mood and Affect: Mood and affect normal.         Result Review :                  Assessment and Plan    Diagnoses and all orders for this visit:    1. Chronic pancolonic ulcerative colitis (CMS/HCC) (Primary)    Other orders  -     azaTHIOprine (IMURAN) 50 MG tablet; Take 1 tablet by mouth Daily.  Dispense: 30 tablet; Refill: 5  -     mesalamine (APRISO) 0.375 g 24 hr capsule; Take 4 capsules by mouth every morning  Dispense: 120 capsule; Refill: 5    21-year-old female presenting to the office today for a follow-up after recent colonoscopy with a history of pan ulcerative colitis.  Patient continues on Imuran 50 mg daily and Apriso 4 tablets daily.  Overall she is doing really well having 2-3 bowel movements daily.  Patient is not experiencing any abdominal pain or rectal bleeding.  Reviewed the patient's most recent colonoscopy and pathology.  We will follow up in the office in 6 months.  Patient was given handout on dietary guidelines for Crohn's disease.  Patient to call with any questions or concerns.    Reinforced healthy diet, lifestyle, and exercise.        Follow Up   Return in about 6 months (around 1/8/2022).  Patient was given instructions and counseling regarding her condition or for health maintenance advice. Please see specific information pulled into the AVS if appropriate.

## 2021-07-16 ENCOUNTER — OFFICE VISIT (OUTPATIENT)
Dept: FAMILY MEDICINE CLINIC | Facility: CLINIC | Age: 21
End: 2021-07-16

## 2021-07-16 VITALS
HEIGHT: 65 IN | SYSTOLIC BLOOD PRESSURE: 122 MMHG | BODY MASS INDEX: 33.09 KG/M2 | WEIGHT: 198.6 LBS | HEART RATE: 94 BPM | TEMPERATURE: 96.9 F | DIASTOLIC BLOOD PRESSURE: 78 MMHG | OXYGEN SATURATION: 99 %

## 2021-07-16 DIAGNOSIS — F41.9 ANXIETY: ICD-10-CM

## 2021-07-16 DIAGNOSIS — K51.919 ULCERATIVE COLITIS WITH COMPLICATION, UNSPECIFIED LOCATION (HCC): ICD-10-CM

## 2021-07-16 DIAGNOSIS — Z00.00 ENCOUNTER FOR ANNUAL PHYSICAL EXAM: Primary | ICD-10-CM

## 2021-07-16 PROCEDURE — 99213 OFFICE O/P EST LOW 20 MIN: CPT | Performed by: NURSE PRACTITIONER

## 2021-07-16 NOTE — PROGRESS NOTES
"Chief Complaint  Annual Exam    Subjective          Lyn Darling presents to [facility]  History of Present Illness  She is here for an annual physical exam and refill on Zoloft.    History of anxiety: She is stable on Zoloft 50 mg daily.    History of ulcerative colitis: She follows with gastroenterology Dr. Cottrell.  She is stable on medications as listed.  She states she just had labs done a few months ago and they were all good.  Objective   Vital Signs:   /78   Pulse 94   Temp 96.9 °F (36.1 °C)   Ht 165.1 cm (65\")   Wt 90.1 kg (198 lb 9.6 oz)   SpO2 99%   BMI 33.05 kg/m²     Physical Exam  Vitals reviewed.   Constitutional:       Appearance: Normal appearance. She is well-developed.   Neck:      Thyroid: No thyroid mass, thyromegaly or thyroid tenderness.   Cardiovascular:      Rate and Rhythm: Normal rate and regular rhythm.      Heart sounds: No murmur heard.   No friction rub. No gallop.    Pulmonary:      Effort: Pulmonary effort is normal.      Breath sounds: Normal breath sounds. No wheezing or rhonchi.   Lymphadenopathy:      Cervical: No cervical adenopathy.   Skin:     General: Skin is warm and dry.   Neurological:      Mental Status: She is alert and oriented to person, place, and time.      Cranial Nerves: No cranial nerve deficit.   Psychiatric:         Mood and Affect: Mood and affect normal.         Behavior: Behavior normal.         Thought Content: Thought content normal. Thought content does not include homicidal or suicidal ideation.         Judgment: Judgment normal.        Result Review :                 Assessment and Plan    Diagnoses and all orders for this visit:    1. Encounter for annual physical exam (Primary)    2. Anxiety  Assessment & Plan:  Doing well on Zoloft, will continue current dose.      3. Ulcerative colitis with complication, unspecified location (CMS/[facility])  Comments:  Stable on meds, following with GI    Other " orders  -     sertraline (ZOLOFT) 50 MG tablet; Take 1 tablet by mouth Daily.  Dispense: 90 tablet; Refill: 3      Follow Up   Return in about 1 year (around 7/16/2022) for Annual physical.  Patient was given instructions and counseling regarding her condition or for health maintenance advice. Please see specific information pulled into the AVS if appropriate.

## 2021-09-23 PROCEDURE — 87081 CULTURE SCREEN ONLY: CPT | Performed by: PHYSICIAN ASSISTANT

## 2022-01-05 NOTE — PROGRESS NOTES
Chief Complaint   Ulcerative colitis    History of Present Illness       Lyn Darling is a 21 y.o. female who presents to [facility] for follow-up with a history of pan ulcerative colitis presents to the office today for a follow-up.  Patient was diagnosed with pan ulcerative colitis in 2015 and she follows up annually.  She continues on Apriso 4 tablets daily and Imuran 50 mg.  Patient reports overall she is doing really well.  Patient reports she is having 1-2 normal bowel movements per day.  She denies any abdominal pain, rectal bleeding,  fever, nausea, vomiting, weight loss, night sweats, melena, hematochezia, hematemesis.    Colonoscopy: Review of the patient's most recent colonoscopy performed by Dr. Guzman on 4/16/2021 displays diffuse ulceration granularity and erythema in the whole colon compatible with mild to moderate ulcerative colitis.  Patient was began on azathioprine and continued on Apriso.  CBC and CMP was ordered for 2 weeks 1 month and a repeat colonoscopy in April 2023.  Pathology with mild crypt architectural distortion and eosinophilic inflammation.     See labs below.      Results       Result Review :       CMP    CMP 5/1/21 5/22/21   Glucose 95 90   BUN 8 8   Creatinine 0.62 0.71   Sodium 133 (A) 134 (A)   Potassium 4.4 4.1   Chloride 104 102   Calcium 8.8 9.0   Albumin 3.8 3.7   Total Bilirubin 0.34 0.22   Alkaline Phosphatase 86 98   AST (SGOT) 17 15   ALT (SGPT) 14 14   (A) Abnormal value       Comments are available for some flowsheets but are not being displayed.           CBC    CBC 5/1/21 5/22/21   WBC 8.99 6.51   RBC 4.67 4.81   Hemoglobin 13.4 13.8   Hematocrit 40.5 42.3   MCV 86.7 87.9   MCH 28.7 28.7   MCHC 33.1 32.6 (A)   RDW 13.3 13.2   Platelets 371 370   (A) Abnormal value                      Past Medical History       Past Medical History:   Diagnosis Date   • Anxiety 12/11/2020   • Eczema    • Iron deficiency anemia,  "unspecified 12/11/2020   • Kidney stones    • Ulcerative colitis (HCC) 12/11/2020       Past Surgical History:   Procedure Laterality Date   • COLONOSCOPY  2015 2021         Current Outpatient Medications:   •  azaTHIOprine (IMURAN) 50 MG tablet, Take 1 tablet by mouth Daily., Disp: 30 tablet, Rfl: 5  •  carbonyl iron (FEOSOL) 45 MG tablet tablet, Take  by mouth Daily., Disp: , Rfl:   •  fexofenadine (Allegra Allergy) 180 MG tablet, , Disp: , Rfl:   •  levonorgestrel-ethinyl estradiol (AVIANE,ALESSE,LESSINA) 0.1-20 MG-MCG per tablet, Take 1 tablet by mouth Daily., Disp: , Rfl:   •  mesalamine (APRISO) 0.375 g 24 hr capsule, Take 4 capsules by mouth every morning, Disp: 120 capsule, Rfl: 5  •  sertraline (ZOLOFT) 50 MG tablet, Take 1 tablet by mouth Daily., Disp: 90 tablet, Rfl: 3     No Known Allergies    Family History   Problem Relation Age of Onset   • Colon cancer Maternal Grandfather    • Breast cancer Maternal Grandmother         Social History     Social History Narrative   • Not on file       Objective       Vital Signs:   /63 (BP Location: Left arm, Patient Position: Sitting, Cuff Size: Adult)   Pulse 87   Ht 165.1 cm (65\")   Wt 93.8 kg (206 lb 11.2 oz)   SpO2 100%   BMI 34.40 kg/m²       Physical Exam  Constitutional:       General: She is not in acute distress.     Appearance: Normal appearance. She is well-developed and normal weight.   Eyes:      Conjunctiva/sclera: Conjunctivae normal.      Pupils: Pupils are equal, round, and reactive to light.      Visual Fields: Right eye visual fields normal and left eye visual fields normal.   Cardiovascular:      Rate and Rhythm: Normal rate and regular rhythm.      Heart sounds: Normal heart sounds.   Pulmonary:      Effort: Pulmonary effort is normal. No retractions.      Breath sounds: Normal breath sounds and air entry.      Comments: Inspection of chest: normal appearance  Abdominal:      General: Bowel sounds are normal.      Palpations: Abdomen " is soft.      Tenderness: There is no abdominal tenderness.      Comments: No appreciable hepatosplenomegaly   Musculoskeletal:      Cervical back: Neck supple.      Right lower leg: No edema.      Left lower leg: No edema.   Lymphadenopathy:      Cervical: No cervical adenopathy.   Skin:     Findings: No lesion.      Comments: Turgor normal   Neurological:      Mental Status: She is alert and oriented to person, place, and time.   Psychiatric:         Mood and Affect: Mood and affect normal.           Assessment & Plan          Assessment and Plan    Diagnoses and all orders for this visit:    1. Chronic pancolonic ulcerative colitis (HCC) (Primary)  -     CBC & Differential; Future  -     Comprehensive Metabolic Panel; Future  -     Vitamin B12 & Folate; Future  -     Iron Profile; Future    Other orders  -     azaTHIOprine (IMURAN) 50 MG tablet; Take 1 tablet by mouth Daily.  Dispense: 30 tablet; Refill: 5  -     mesalamine (APRISO) 0.375 g 24 hr capsule; Take 4 capsules by mouth every morning  Dispense: 120 capsule; Refill: 5      21-year-old female presenting the office today for a follow-up with a history of pan ulcerative colitis.  Patient continues on Imuran 50 mg daily and Apriso 4 tablets daily.  Patient continues to do really well and is not experiencing any abdominal pain or rectal bleeding.  We will follow-up in 6 months.  I have ordered a CBC, CMP, B12 folate and iron level.  Patient to call with any questions or concerns.  Patient agreeable to this plan.          Follow Up       Follow Up   Return in about 6 months (around 7/10/2022).  Patient was given instructions and counseling regarding her condition or for health maintenance advice. Please see specific information pulled into the AVS if appropriate.

## 2022-01-10 ENCOUNTER — OFFICE VISIT (OUTPATIENT)
Dept: GASTROENTEROLOGY | Facility: CLINIC | Age: 22
End: 2022-01-10

## 2022-01-10 VITALS
BODY MASS INDEX: 34.44 KG/M2 | WEIGHT: 206.7 LBS | SYSTOLIC BLOOD PRESSURE: 121 MMHG | HEART RATE: 87 BPM | HEIGHT: 65 IN | DIASTOLIC BLOOD PRESSURE: 63 MMHG | OXYGEN SATURATION: 100 %

## 2022-01-10 DIAGNOSIS — K51.00 CHRONIC PANCOLONIC ULCERATIVE COLITIS: Primary | ICD-10-CM

## 2022-01-10 PROCEDURE — 99213 OFFICE O/P EST LOW 20 MIN: CPT | Performed by: NURSE PRACTITIONER

## 2022-01-10 RX ORDER — AZATHIOPRINE 50 MG/1
50 TABLET ORAL DAILY
Qty: 30 TABLET | Refills: 5 | Status: SHIPPED | OUTPATIENT
Start: 2022-01-10 | End: 2022-07-11 | Stop reason: SDUPTHER

## 2022-01-10 RX ORDER — MESALAMINE 0.38 G/1
CAPSULE, EXTENDED RELEASE ORAL
Qty: 120 CAPSULE | Refills: 5 | Status: SHIPPED | OUTPATIENT
Start: 2022-01-10 | End: 2022-07-11 | Stop reason: SDUPTHER

## 2022-01-13 PROCEDURE — U0004 COV-19 TEST NON-CDC HGH THRU: HCPCS | Performed by: PHYSICIAN ASSISTANT

## 2022-01-16 ENCOUNTER — TELEPHONE (OUTPATIENT)
Dept: URGENT CARE | Facility: CLINIC | Age: 22
End: 2022-01-16

## 2022-01-16 DIAGNOSIS — U07.1 COVID-19: Primary | ICD-10-CM

## 2022-01-20 ENCOUNTER — TELEPHONE (OUTPATIENT)
Dept: GASTROENTEROLOGY | Facility: CLINIC | Age: 22
End: 2022-01-20

## 2022-01-20 NOTE — TELEPHONE ENCOUNTER
I spoke with pt. She has not had labs performed yet but plans to have performed soon. Pt aware to go to any [facility] facility, and is aware we will call with results. Pt agreed to this plan.

## 2022-02-09 RX ORDER — LEVONORGESTREL AND ETHINYL ESTRADIOL 0.1-0.02MG
KIT ORAL
Qty: 28 TABLET | Refills: 4 | Status: SHIPPED | OUTPATIENT
Start: 2022-02-09 | End: 2022-06-02 | Stop reason: SDUPTHER

## 2022-06-02 ENCOUNTER — TELEPHONE (OUTPATIENT)
Dept: OBSTETRICS AND GYNECOLOGY | Facility: CLINIC | Age: 22
End: 2022-06-02

## 2022-06-02 DIAGNOSIS — Z30.41 ENCOUNTER FOR SURVEILLANCE OF CONTRACEPTIVE PILLS: Primary | ICD-10-CM

## 2022-06-02 RX ORDER — LEVONORGESTREL AND ETHINYL ESTRADIOL 0.1-0.02MG
1 KIT ORAL DAILY
Qty: 28 TABLET | Refills: 3 | Status: SHIPPED | OUTPATIENT
Start: 2022-06-02 | End: 2022-06-10

## 2022-06-10 DIAGNOSIS — Z30.41 ENCOUNTER FOR SURVEILLANCE OF CONTRACEPTIVE PILLS: ICD-10-CM

## 2022-06-10 RX ORDER — LEVONORGESTREL AND ETHINYL ESTRADIOL 0.1-0.02MG
KIT ORAL
Qty: 28 TABLET | Refills: 3 | Status: SHIPPED | OUTPATIENT
Start: 2022-06-10 | End: 2022-10-18

## 2022-07-07 NOTE — PROGRESS NOTES
Chief Complaint   6 moth follow up    History of Present Illness       Lyn Darling is a 22 y.o. female who presents to [facility] for follow-up with a history of pan ulcerative colitis.  Patient was diagnosed with pan ulcerative colitis in 2015 and she follows up annually.  She continues on Apriso 4 tablets daily and Imuran 50 mg.  Overall patient is doing really well she having 1-2 normal bowel movement per day.  Labs were ordered at previous office visit patient has not been able to complete these yet.  Patient denies abdominal pain, fever, nausea, vomiting, weight loss, night sweats, melena, hematochezia, hematemesis.    Patient has been having sciatic nerve pain on the left side that causes tingling down the left leg.  She plans to see her primary care and has an appointment in the following 2 weeks.  She denies any injury.  She has been doing stretches at home.  She has been taking minimal ibuprofen for relief.  Patient is requesting something for sciatic nerve pain.    Colonoscopy: Review of the patient's most recent colonoscopy performed by Dr. Guzman on 4/16/2021 displays diffuse ulceration granularity and erythema in the whole colon compatible with mild to moderate ulcerative colitis.  Patient was began on azathioprine and continued on Apriso.  CBC and CMP was ordered for 2 weeks 1 month and a repeat colonoscopy in April 2023.  Pathology with mild crypt architectural distortion and eosinophilic inflammation.       Results       Result Review :                             Past Medical History       Past Medical History:   Diagnosis Date   • Anxiety 12/11/2020   • Eczema    • Iron deficiency anemia, unspecified 12/11/2020   • Kidney stones    • Ulcerative colitis (HCC) 12/11/2020       Past Surgical History:   Procedure Laterality Date   • COLONOSCOPY  2015 2021         Current Outpatient Medications:   •  azaTHIOprine (IMURAN) 50 MG tablet, Take 1 tablet by mouth  "Daily., Disp: 30 tablet, Rfl: 11  •  carbonyl iron (FEOSOL) 45 MG tablet tablet, Take  by mouth Daily., Disp: , Rfl:   •  Falmina 0.1-20 MG-MCG per tablet, TAKE ONE TABLET BY MOUTH EVERY DAY, Disp: 28 tablet, Rfl: 3  •  fexofenadine (ALLEGRA) 180 MG tablet, , Disp: , Rfl:   •  mesalamine (APRISO) 0.375 g 24 hr capsule, Take 4 capsules by mouth every morning, Disp: 120 capsule, Rfl: 11  •  sertraline (ZOLOFT) 50 MG tablet, Take 1 tablet by mouth Daily., Disp: 90 tablet, Rfl: 3  •  tiZANidine (ZANAFLEX) 2 MG tablet, Take 1 tablet by mouth At Night As Needed for Muscle Spasms., Disp: 30 tablet, Rfl: 0     No Known Allergies    Family History   Problem Relation Age of Onset   • Colon cancer Maternal Grandfather    • Breast cancer Maternal Grandmother         Social History     Social History Narrative   • Not on file       Objective     Vital Signs:   /65   Pulse 105   Ht 165.1 cm (65\")   Wt 98 kg (216 lb)   SpO2 100%   BMI 35.94 kg/m²       Physical Exam  Constitutional:       General: She is not in acute distress.     Appearance: Normal appearance. She is well-developed. She is obese.   Eyes:      Conjunctiva/sclera: Conjunctivae normal.      Pupils: Pupils are equal, round, and reactive to light.      Visual Fields: Right eye visual fields normal and left eye visual fields normal.   Cardiovascular:      Rate and Rhythm: Normal rate and regular rhythm.      Heart sounds: Normal heart sounds.   Pulmonary:      Effort: Pulmonary effort is normal. No retractions.      Breath sounds: Normal breath sounds and air entry.      Comments: Inspection of chest: normal appearance  Abdominal:      General: Bowel sounds are normal.      Palpations: Abdomen is soft.      Tenderness: There is no abdominal tenderness.      Comments: No appreciable hepatosplenomegaly   Musculoskeletal:      Cervical back: Neck supple.      Right lower leg: No edema.      Left lower leg: No edema.   Lymphadenopathy:      Cervical: No cervical " adenopathy.   Skin:     Findings: No lesion.      Comments: Turgor normal   Neurological:      Mental Status: She is alert and oriented to person, place, and time.   Psychiatric:         Mood and Affect: Mood and affect normal.           Assessment & Plan          Assessment and Plan    Diagnoses and all orders for this visit:    1. Chronic pancolonic ulcerative colitis (HCC) (Primary)  -     CBC & Differential; Future  -     Comprehensive Metabolic Panel; Future  -     Iron Profile; Future  -     Vitamin B12 & Folate; Future    2. Sciatic nerve pain, left    Other orders  -     mesalamine (APRISO) 0.375 g 24 hr capsule; Take 4 capsules by mouth every morning  Dispense: 120 capsule; Refill: 11  -     azaTHIOprine (IMURAN) 50 MG tablet; Take 1 tablet by mouth Daily.  Dispense: 30 tablet; Refill: 11  -     tiZANidine (ZANAFLEX) 2 MG tablet; Take 1 tablet by mouth At Night As Needed for Muscle Spasms.  Dispense: 30 tablet; Refill: 0           22-year-old female presenting the office today for a follow-up with a history of pan ulcerative colitis.  Patient continues on Imuran 50 mg daily and Apriso 4 tablets daily.  Patient continues to do really well and is not experiencing any abdominal pain or rectal bleeding.  We will follow-up in 1 year.  I have ordered a CBC, CMP, B12 folate and iron level for the patient to complete at her convenience.  I have prescribed tizanidine to be used nightly for muscle relaxant related to sciatic nerve pain.  She can continue NSAIDs for a short course to help with inflammation.  We have discussed physical therapy maneuvers related to sciatic nerve pain. Patient to call with any questions or concerns.  Patient agreeable to this plan.          Follow Up       Follow Up   Return in about 1 year (around 7/11/2023).  Patient was given instructions and counseling regarding her condition or for health maintenance advice. Please see specific information pulled into the AVS if appropriate.

## 2022-07-11 ENCOUNTER — OFFICE VISIT (OUTPATIENT)
Dept: GASTROENTEROLOGY | Facility: CLINIC | Age: 22
End: 2022-07-11

## 2022-07-11 VITALS
BODY MASS INDEX: 35.99 KG/M2 | HEIGHT: 65 IN | WEIGHT: 216 LBS | OXYGEN SATURATION: 100 % | HEART RATE: 105 BPM | DIASTOLIC BLOOD PRESSURE: 65 MMHG | SYSTOLIC BLOOD PRESSURE: 120 MMHG

## 2022-07-11 DIAGNOSIS — K51.00 CHRONIC PANCOLONIC ULCERATIVE COLITIS: Primary | ICD-10-CM

## 2022-07-11 DIAGNOSIS — M54.32 SCIATIC NERVE PAIN, LEFT: ICD-10-CM

## 2022-07-11 PROCEDURE — 99214 OFFICE O/P EST MOD 30 MIN: CPT | Performed by: NURSE PRACTITIONER

## 2022-07-11 RX ORDER — MESALAMINE 0.38 G/1
CAPSULE, EXTENDED RELEASE ORAL
Qty: 120 CAPSULE | Refills: 11 | Status: SHIPPED | OUTPATIENT
Start: 2022-07-11

## 2022-07-11 RX ORDER — AZATHIOPRINE 50 MG/1
50 TABLET ORAL DAILY
Qty: 30 TABLET | Refills: 11 | Status: SHIPPED | OUTPATIENT
Start: 2022-07-11

## 2022-07-11 RX ORDER — TIZANIDINE 2 MG/1
2 TABLET ORAL NIGHTLY PRN
Qty: 30 TABLET | Refills: 0 | Status: SHIPPED | OUTPATIENT
Start: 2022-07-11 | End: 2022-11-07

## 2022-07-11 NOTE — PATIENT INSTRUCTIONS
Ulcerative Colitis, Adult    Ulcerative colitis is long-term (chronic) inflammation of the large intestine (colon) and rectum. Sores (ulcers) may also form in these areas.  Ulcerative colitis, along with a closely related condition called Crohn's disease, is often referred to as inflammatory bowel disease.  What are the causes?  This condition may be caused by increased activity of the immune system in the intestines. The immune system is the system that protects the body against harmful bacteria, viruses, fungi, and other things that can make you sick. The cause of the increased activity of the immune system is not known.  What increases the risk?  The following factors may make you more likely to develop this condition:  Being under 30 years old.  Having a family history of ulcerative colitis.  What are the signs or symptoms?  Symptoms vary depending on how severe the condition is. Common symptoms include:  Rectal bleeding.  Diarrhea, often with blood or pus in the stool.  Other symptoms can include:  Pain or cramping in the abdomen.  Fever.  Tiredness (fatigue).  Nausea, loss of appetite, or weight loss.  Rectal pain.  A strong and sudden need to have a bowel movement (bowel urgency).  Anemia.  Yellowing of the skin (jaundice) from liver dysfunction or skin rashes.  Symptoms can range from mild to severe. They may come and go.  How is this diagnosed?  This condition may be diagnosed based on:  Your symptoms and medical history.  A physical exam.  Tests, including:  Blood tests and stool tests.  X-rays.  CT scan.  MRI.  Colonoscopy. For this test, a flexible tube is inserted into your anus, and your colon is examined.  Biopsy. In this test, a tissue sample is taken from your colon and examined under a microscope.  How is this treated?  There is no cure for this condition, but it can be managed. Treatment depends on the severity of the disease. Treatment for this condition may include medicines to:  Decrease  swelling and inflammation.  Control your immune system.  Treat infections.  Relieve pain.  Control diarrhea.  Severe flare-ups may need to be treated at a hospital. Treatment in a hospital may involve:  Resting the bowel. This involves not eating or drinking for a period of time.  Getting medicines through an IV.  Getting fluids and nutrition through:  An IV.  A tube that is passed through the nose and into the stomach (nasogastric or NG tube).  Surgery to remove the affected part of the colon. This may be done if other treatments are not helping.  This condition increases the risk of colon cancer. Adults with this condition will need to be checked for colon cancer throughout life.  Follow these instructions at home:  Medicines and vitamins  Take over-the-counter and prescription medicines only as told by your health care provider. Do not take aspirin.  If you were prescribed an antibiotic medicine, take it as told by your health care provider. Do not stop taking the antibiotic even if you start to feel better.  Ask your health care provider if you should take any vitamins or supplements. You may need to take:  Calcium and vitamin D for bone health.  Iron to help treat anemia.  Lifestyle  Exercise regularly.  Work with your health care provider to manage your condition and educate yourself about your condition.  Do not use any products that contain nicotine or tobacco. These products include cigarettes, chewing tobacco, and vaping devices, such as e-cigarettes. If you need help quitting, ask your health care provider.  If you drink alcohol:  Limit how much you have to:  0-1 drink a day for women who are not pregnant.  0-2 drinks a day for men.  Know how much alcohol is in a drink. In the [place] one drink equals one 12 oz bottle of beer (355 mL), one 5 oz glass of wine (148 mL), or one 1½ oz glass of hard liquor (44 mL).  Eating and drinking  Keep a food diary. This may help you identify and avoid any foods that  trigger your symptoms.  Drink enough fluid to keep your urine pale yellow.  Follow a well-balanced diet as told by your health care provider. This may include:  Avoiding carbonated drinks.  Avoiding popcorn, vegetable skins, nuts, and other high-fiber foods.  Avoiding high-fat foods.  Eating smaller meals, but more often.  Limiting sugary drinks.  Limiting caffeine.  Follow food safety recommendations as told by your health care provider. This may include making sure you:  Avoid eating raw or undercooked meat, fish, or eggs.  Do not eat or drink spoiled or  foods and drinks.  General instructions  Wash your hands often with soap and water for at least 20 seconds. If soap and water are not available, use hand .  Stay up to date on your vaccinations, including a yearly (annual) flu shot. Ask your health care provider which vaccines you should get.  Have cancer screening tests as told by your health care provider. Ulcerative colitis may place you at increased risk for colon cancer.  Keep all follow-up visits. This is important.  Where to find more information  You can find some helpful and educational information about ulcerative colitis at the [employer] [place] [employer] and [employer]estive and Kidney Diseases online here: www.niddk.nih.gov  Contact a health care provider if:  Your symptoms do not improve or they get worse with treatment.  You continue to lose weight.  You have constant cramps or loose stools.  You develop a new skin rash, skin sores, or eye problems.  You have a fever or chills.  Get help right away if:  You have bloody diarrhea.  You have severe bleeding from the rectum.  You feel that your heart is racing.  You have severe pain in your abdomen.  Your abdomen swells (abdominal distension).  Your abdomen is tender to the touch.  You vomit.  Summary  Ulcerative colitis is long-lasting (chronic) inflammation of the large intestine (colon) and rectum. Sores (ulcers) may also form in these  areas.  Follow instructions from your health care provider about medicines, lifestyle changes, and eating and drinking.  Contact your health care provider if symptoms do not improve or they get worse with treatment.  Get help right away if you have severe abdominal pain, abdominal swelling, or severe bleeding from the rectum.  Keep all follow-up visits. This is important.  This information is not intended to replace advice given to you by your health care provider. Make sure you discuss any questions you have with your health care provider.  Document Revised: 08/24/2021 Document Reviewed: 08/24/2021  Elsevier Patient Education © 2021 [employer]

## 2022-07-16 ENCOUNTER — LAB (OUTPATIENT)
Dept: LAB | Facility: HOSPITAL | Age: 22
End: 2022-07-16

## 2022-07-16 DIAGNOSIS — K51.00 CHRONIC PANCOLONIC ULCERATIVE COLITIS: ICD-10-CM

## 2022-07-16 LAB
ALBUMIN SERPL-MCNC: 4 G/DL (ref 3.5–5.2)
ALBUMIN/GLOB SERPL: 1.1 G/DL
ALP SERPL-CCNC: 102 U/L (ref 39–117)
ALT SERPL W P-5'-P-CCNC: 16 U/L (ref 1–33)
ANION GAP SERPL CALCULATED.3IONS-SCNC: 11 MMOL/L (ref 5–15)
AST SERPL-CCNC: 15 U/L (ref 1–32)
BASOPHILS # BLD AUTO: 0.03 10*3/MM3 (ref 0–0.2)
BASOPHILS NFR BLD AUTO: 0.4 % (ref 0–1.5)
BILIRUB SERPL-MCNC: 0.2 MG/DL (ref 0–1.2)
BUN SERPL-MCNC: 7 MG/DL (ref 6–20)
BUN/CREAT SERPL: 10.3 (ref 7–25)
CALCIUM SPEC-SCNC: 8.9 MG/DL (ref 8.6–10.5)
CHLORIDE SERPL-SCNC: 105 MMOL/L (ref 98–107)
CO2 SERPL-SCNC: 22 MMOL/L (ref 22–29)
CREAT SERPL-MCNC: 0.68 MG/DL (ref 0.57–1)
DEPRECATED RDW RBC AUTO: 44.5 FL (ref 37–54)
EGFRCR SERPLBLD CKD-EPI 2021: 124.9 ML/MIN/1.73
EOSINOPHIL # BLD AUTO: 0.47 10*3/MM3 (ref 0–0.4)
EOSINOPHIL NFR BLD AUTO: 5.8 % (ref 0.3–6.2)
ERYTHROCYTE [DISTWIDTH] IN BLOOD BY AUTOMATED COUNT: 14.2 % (ref 12.3–15.4)
FERRITIN SERPL-MCNC: 14 NG/ML (ref 13–150)
FOLATE SERPL-MCNC: >20 NG/ML (ref 4.78–24.2)
GLOBULIN UR ELPH-MCNC: 3.8 GM/DL
GLUCOSE SERPL-MCNC: 87 MG/DL (ref 65–99)
HCT VFR BLD AUTO: 36 % (ref 34–46.6)
HGB BLD-MCNC: 11.4 G/DL (ref 12–15.9)
IMM GRANULOCYTES # BLD AUTO: 0.02 10*3/MM3 (ref 0–0.05)
IMM GRANULOCYTES NFR BLD AUTO: 0.2 % (ref 0–0.5)
IRON 24H UR-MRATE: 20 MCG/DL (ref 37–145)
IRON SATN MFR SERPL: 4 % (ref 20–50)
LYMPHOCYTES # BLD AUTO: 2.1 10*3/MM3 (ref 0.7–3.1)
LYMPHOCYTES NFR BLD AUTO: 26.1 % (ref 19.6–45.3)
MCH RBC QN AUTO: 27.1 PG (ref 26.6–33)
MCHC RBC AUTO-ENTMCNC: 31.7 G/DL (ref 31.5–35.7)
MCV RBC AUTO: 85.5 FL (ref 79–97)
MONOCYTES # BLD AUTO: 0.41 10*3/MM3 (ref 0.1–0.9)
MONOCYTES NFR BLD AUTO: 5.1 % (ref 5–12)
NEUTROPHILS NFR BLD AUTO: 5.01 10*3/MM3 (ref 1.7–7)
NEUTROPHILS NFR BLD AUTO: 62.4 % (ref 42.7–76)
NRBC BLD AUTO-RTO: 0 /100 WBC (ref 0–0.2)
PLATELET # BLD AUTO: 441 10*3/MM3 (ref 140–450)
PMV BLD AUTO: 10.3 FL (ref 6–12)
POTASSIUM SERPL-SCNC: 4.2 MMOL/L (ref 3.5–5.2)
PROT SERPL-MCNC: 7.8 G/DL (ref 6–8.5)
RBC # BLD AUTO: 4.21 10*6/MM3 (ref 3.77–5.28)
SODIUM SERPL-SCNC: 138 MMOL/L (ref 136–145)
TIBC SERPL-MCNC: 477 MCG/DL (ref 298–536)
TRANSFERRIN SERPL-MCNC: 320 MG/DL (ref 200–360)
VIT B12 BLD-MCNC: 286 PG/ML (ref 211–946)
WBC NRBC COR # BLD: 8.04 10*3/MM3 (ref 3.4–10.8)

## 2022-07-16 PROCEDURE — 82746 ASSAY OF FOLIC ACID SERUM: CPT

## 2022-07-16 PROCEDURE — 80053 COMPREHEN METABOLIC PANEL: CPT

## 2022-07-16 PROCEDURE — 83540 ASSAY OF IRON: CPT

## 2022-07-16 PROCEDURE — 36415 COLL VENOUS BLD VENIPUNCTURE: CPT

## 2022-07-16 PROCEDURE — 85025 COMPLETE CBC W/AUTO DIFF WBC: CPT

## 2022-07-16 PROCEDURE — 84466 ASSAY OF TRANSFERRIN: CPT

## 2022-07-16 PROCEDURE — 82607 VITAMIN B-12: CPT

## 2022-07-16 PROCEDURE — 82728 ASSAY OF FERRITIN: CPT

## 2022-07-19 ENCOUNTER — TELEPHONE (OUTPATIENT)
Dept: GASTROENTEROLOGY | Facility: CLINIC | Age: 22
End: 2022-07-19

## 2022-07-19 DIAGNOSIS — D50.9 IRON DEFICIENCY ANEMIA, UNSPECIFIED IRON DEFICIENCY ANEMIA TYPE: Primary | ICD-10-CM

## 2022-07-19 NOTE — TELEPHONE ENCOUNTER
I spoke with Ms Darling, notified of lab results. Voiced understanding.  Will place lab order. marlee

## 2022-07-19 NOTE — TELEPHONE ENCOUNTER
----- Message from CELIA Aly sent at 7/18/2022 12:08 PM EDT -----  Ferritin normal. CMP normal with normal glucose, kidney function, electrolytes and liver enzymes.  Normal B12 folate.  CBC with decreased hemoglobin at 11.4.  Iron deficiency noted with total iron at 20 and iron saturation of 4%.  Continue iron supplement daily.  Recheck CBC and iron in 3 months.

## 2022-08-01 ENCOUNTER — OFFICE VISIT (OUTPATIENT)
Dept: FAMILY MEDICINE CLINIC | Facility: CLINIC | Age: 22
End: 2022-08-01

## 2022-08-01 VITALS
BODY MASS INDEX: 36.42 KG/M2 | HEART RATE: 100 BPM | SYSTOLIC BLOOD PRESSURE: 118 MMHG | DIASTOLIC BLOOD PRESSURE: 76 MMHG | OXYGEN SATURATION: 99 % | WEIGHT: 218.6 LBS | HEIGHT: 65 IN

## 2022-08-01 DIAGNOSIS — F41.9 ANXIETY: Primary | ICD-10-CM

## 2022-08-01 DIAGNOSIS — D50.9 IRON DEFICIENCY ANEMIA, UNSPECIFIED IRON DEFICIENCY ANEMIA TYPE: ICD-10-CM

## 2022-08-01 DIAGNOSIS — Z23 NEED FOR TDAP VACCINATION: ICD-10-CM

## 2022-08-01 PROCEDURE — 90471 IMMUNIZATION ADMIN: CPT

## 2022-08-01 PROCEDURE — 99213 OFFICE O/P EST LOW 20 MIN: CPT

## 2022-08-01 PROCEDURE — 90715 TDAP VACCINE 7 YRS/> IM: CPT

## 2022-08-01 NOTE — PROGRESS NOTES
Chief Complaint  Chief Complaint   Patient presents with   • Establish Care     Transfer from [facility]   • Med Refill       Subjective          Lyn Darling presents to [facility]  History of Present Illness  Patient presents today to follow-up on anxiety.  She reports that Zoloft 50 mg daily is managing her anxiety well.  She sees gastroenterology for her ulcerative colitis and is taking Imuran and Apriso prescribed by them.  They have been monitoring her iron deficiency anemia as well.  Recent lab work showed that she does remain iron deficient and will continue taking iron supplement daily.  She has orders from gastroenterology to recheck CBC and iron in 3 months.      She also reports some intermittent left hip sciatica pain.  She was prescribed tizanidine by gastroenterology which seemed to really help with the pain after taking it consistently for a week.  She does not currently have any pain and still has some medication left in case it does occur again.     Objective     Medical History:  Past Medical History:   Diagnosis Date   • Anxiety 12/11/2020   • Eczema    • Iron deficiency anemia, unspecified 12/11/2020   • Kidney stones    • Ulcerative colitis (HCC) 12/11/2020     Past Surgical History:   Procedure Laterality Date   • COLONOSCOPY  2015 2021      Social History     Tobacco Use   • Smoking status: Passive Smoke Exposure - Never Smoker   • Smokeless tobacco: Never Used   Vaping Use   • Vaping Use: Never used   Substance Use Topics   • Alcohol use: Never   • Drug use: Never     Family History   Problem Relation Age of Onset   • Colon cancer Maternal Grandfather    • Breast cancer Maternal Grandmother        Medications:  Prior to Admission medications    Medication Sig Start Date End Date Taking? Authorizing Provider   azaTHIOprine (IMURAN) 50 MG tablet Take 1 tablet by mouth Daily. 7/11/22  Yes Denisha Bazan APRN   carbonyl iron (FEOSOL) 45 MG tablet tablet  "Take  by mouth Daily.   Yes ProviderMichoacano MD   Falmina 0.1-20 MG-MCG per tablet TAKE ONE TABLET BY MOUTH EVERY DAY 6/10/22  Yes Sheree Llamas MD   fexofenadine (ALLEGRA) 180 MG tablet    Yes ProviderMichoacano MD   mesalamine (APRISO) 0.375 g 24 hr capsule Take 4 capsules by mouth every morning 7/11/22  Yes Denisha Bazan APRN   sertraline (ZOLOFT) 50 MG tablet TAKE ONE TABLET BY MOUTH EVERY DAY 7/29/22  Yes Joyce Rodriguez APRN   tiZANidine (ZANAFLEX) 2 MG tablet Take 1 tablet by mouth At Night As Needed for Muscle Spasms. 7/11/22  Yes Denisha Bazan APRN        Allergies:   Patient has no known allergies.    Health Maintenance Due   Topic Date Due   • ANNUAL PHYSICAL  Never done   • HEPATITIS C SCREENING  Never done           Vital Signs:     /76   Pulse 100   Ht 165.1 cm (65\")   Wt 99.2 kg (218 lb 9.6 oz)   SpO2 99%   BMI 36.38 kg/m²       Physical Exam  Vitals reviewed.   Constitutional:       Appearance: Normal appearance. She is well-developed.   HENT:      Head: Normocephalic and atraumatic.      Right Ear: External ear normal.      Left Ear: External ear normal.   Eyes:      Conjunctiva/sclera: Conjunctivae normal.      Pupils: Pupils are equal, round, and reactive to light.   Cardiovascular:      Rate and Rhythm: Normal rate and regular rhythm.      Heart sounds: No murmur heard.    No friction rub. No gallop.   Pulmonary:      Effort: Pulmonary effort is normal.      Breath sounds: Normal breath sounds. No wheezing or rhonchi.   Abdominal:      General: Bowel sounds are normal. There is no distension.      Palpations: Abdomen is soft.      Tenderness: There is no abdominal tenderness.   Skin:     General: Skin is warm and dry.   Neurological:      Mental Status: She is alert and oriented to person, place, and time.      Cranial Nerves: No cranial nerve deficit.   Psychiatric:         Mood and Affect: Mood and affect normal.         Behavior: Behavior normal.         Thought " Content: Thought content normal.         Judgment: Judgment normal.          Result Review :                   Assessment and Plan    Diagnoses and all orders for this visit:    1. Anxiety (Primary)  Assessment & Plan:  Well managed with current dose of Zoloft.  Continue taking Zoloft 50 mg daily.    Orders:  -     sertraline (ZOLOFT) 50 MG tablet; Take 1 tablet by mouth Daily.  Dispense: 90 tablet; Refill: 2  -     Thyroid Panel With TSH; Future    2. Iron deficiency anemia, unspecified iron deficiency anemia type  Assessment & Plan:  Continue taking iron supplements daily and recheck labs in 3 months.      3. Need for Tdap vaccination  -     Tdap Vaccine Greater Than or Equal To 8yo IM  Patient will have thyroid panel drawn at her next lab draw for iron deficiency anemia as she has had an elevated TSH in the past.  She will follow-up with me in 6 months.      Smoking Cessation:    Lyn Darling  reports that she is a non-smoker but has been exposed to tobacco smoke. She has never used smokeless tobacco.        Follow Up   Return in about 6 months (around 2/1/2023).  Patient was given instructions and counseling regarding her condition or for health maintenance advice. Please see specific information pulled into the AVS if appropriate.

## 2022-08-05 ENCOUNTER — PATIENT ROUNDING (BHMG ONLY) (OUTPATIENT)
Dept: FAMILY MEDICINE CLINIC | Facility: CLINIC | Age: 22
End: 2022-08-05

## 2022-08-05 NOTE — PROGRESS NOTES
August 5, 2022    Hello, may I speak with Lyn Herediakaren?    My name is Michael Rodrigues    I am  with [facility]  [address]  [address]  [facility] [address]  [phone].    Before we get started may I verify your date of birth? 2000    I am calling to officially welcome you to our practice and ask about your recent visit. Is this a good time to talk? yes    Tell me about your visit with us. What things went well?  The visit went great        We're always looking for ways to make our patients' experiences even better. Do you have recommendations on ways we may improve?  no    Overall were you satisfied with your first visit to our practice? yes       I appreciate you taking the time to speak with me today. Is there anything else I can do for you? no      Thank you, and have a great day.

## 2022-10-18 DIAGNOSIS — Z30.41 ENCOUNTER FOR SURVEILLANCE OF CONTRACEPTIVE PILLS: ICD-10-CM

## 2022-10-18 RX ORDER — LEVONORGESTREL AND ETHINYL ESTRADIOL 0.1-0.02MG
KIT ORAL
Qty: 28 TABLET | Refills: 0 | Status: SHIPPED | OUTPATIENT
Start: 2022-10-18 | End: 2022-11-03 | Stop reason: SDUPTHER

## 2022-11-03 NOTE — PROGRESS NOTES
"Well Woman Visit    CC: Scheduled annual well gyn visit  Chief Complaint   Patient presents with   • Annual Exam       Myriad intake in the past?: No Due to age.        Contraception:  Birth control pill    HPI:   22 y.o.     Menses:   q 28 days, lasts 3-5 days, changes products q 4-6 hrs on heaviest days.     Pain:  Mild, OTC meds control discomfort    PCP: does manage PMHx and preventative labs  History: PMHx, Meds, Allergies, PSHx, Social Hx, and POBHx all reviewed and updated.    Pt has no complaints today.    PHYSICAL EXAM:  /83   Pulse 92   Ht 165.1 cm (65\")   Wt 103 kg (227 lb)   LMP 10/20/2022 (Approximate)   BMI 37.77 kg/m²  Not found.  General- NAD, alert and oriented, appropriate  Psych- Normal mood, good memory  Neck- No masses, no thyroid enlargement  CV- Regular rhythm, no murnurs  Resp- CTA to bases, no wheezes  Abdomen- Soft, non distended, non tender, no masses    Breast left-  Bilaterally symmetrical, no masses, non tender, no nipple discharge  Breast right- Bilaterally symmetrical, no masses, non tender, no nipple discharge    External genitalia- Normal female, no lesions  Urethra/meatus- Normal, no masses, non tender  Bladder- Normal, no masses, non tender  Vagina- Normal, no atrophy, no lesions, no discharge.  Prolapse: none  Cvx- Normal, no lesions, no discharge, No cervical motion tenderness  Uterus- Normal size, shape & consistency.  Non tender, mobile, & no prolapse  Adnexa- No mass, non tender  Anus/Rectum/Perineum- Not performed    Lymphatic- No palpable neck, axillary, or groin nodes  Ext- No edema, no cyanosis    Skin- No lesions, no rashes, no acanthosis nigricans      ASSESSMENT and PLAN:    Diagnoses and all orders for this visit:    1. Well woman exam with routine gynecological exam (Primary)    2. Encounter for surveillance of contraceptive pills        Preventative:  • BREAST HEALTH- Monthly self breast exam importance and how to reviewed. MMG and/or MRI (prn) " reviewed per society guidelines and her individual history. Screen: Not medically needed  • CERVICAL CANCER Screening- Reviewed current ASCCP guidelines for screening w and wo cotest HPV, age specific.  Screen: Updated today  • COLON CANCER Screening- Reviewed current medical society guidelines and options.  Screen:  Not medically needed  • SEXUAL HEALTH: STD screening recommended.  She declines.  She understands risks of STDs NLT infection (herself and current/future partners), infertility, chronic pain, potential future surgery/complications  • Follow up PCP/Specialist PMHx and Labs      She understands the importance of having any ordered tests to be performed in a timely fashion.  The risks of not performing them include, but are not limited to, advanced cancer stages, bone loss from osteoporosis and/or subsequent increase in morbidity and/or mortality.  She is encouraged to review her results online and/or contact or office if she has questions.     Follow Up:  No follow-ups on file.            Sheree Llamas MD  11/07/2022    [facility]  [facility] [place] [address]  Dept: [phone]  Dept Fax: [phone]  Loc: [phone]  Loc Fax: [phone]

## 2022-11-07 ENCOUNTER — OFFICE VISIT (OUTPATIENT)
Dept: OBSTETRICS AND GYNECOLOGY | Facility: CLINIC | Age: 22
End: 2022-11-07

## 2022-11-07 VITALS
HEIGHT: 65 IN | WEIGHT: 227 LBS | HEART RATE: 92 BPM | BODY MASS INDEX: 37.82 KG/M2 | DIASTOLIC BLOOD PRESSURE: 83 MMHG | SYSTOLIC BLOOD PRESSURE: 139 MMHG

## 2022-11-07 DIAGNOSIS — Z01.419 WELL WOMAN EXAM WITH ROUTINE GYNECOLOGICAL EXAM: Primary | ICD-10-CM

## 2022-11-07 DIAGNOSIS — Z30.41 ENCOUNTER FOR SURVEILLANCE OF CONTRACEPTIVE PILLS: ICD-10-CM

## 2022-11-07 PROCEDURE — 99395 PREV VISIT EST AGE 18-39: CPT | Performed by: OBSTETRICS & GYNECOLOGY

## 2022-11-07 PROCEDURE — G0123 SCREEN CERV/VAG THIN LAYER: HCPCS | Performed by: OBSTETRICS & GYNECOLOGY

## 2022-11-07 PROCEDURE — 87624 HPV HI-RISK TYP POOLED RSLT: CPT | Performed by: OBSTETRICS & GYNECOLOGY

## 2022-11-07 RX ORDER — LEVONORGESTREL AND ETHINYL ESTRADIOL 0.1-0.02MG
1 KIT ORAL DAILY
Qty: 28 TABLET | Refills: 11 | Status: SHIPPED | OUTPATIENT
Start: 2022-11-07

## 2022-11-08 ENCOUNTER — TELEPHONE (OUTPATIENT)
Dept: GASTROENTEROLOGY | Facility: CLINIC | Age: 22
End: 2022-11-08

## 2022-11-12 ENCOUNTER — LAB (OUTPATIENT)
Dept: LAB | Facility: HOSPITAL | Age: 22
End: 2022-11-12

## 2022-11-12 DIAGNOSIS — F41.9 ANXIETY: ICD-10-CM

## 2022-11-12 DIAGNOSIS — D50.9 IRON DEFICIENCY ANEMIA, UNSPECIFIED IRON DEFICIENCY ANEMIA TYPE: ICD-10-CM

## 2022-11-12 LAB
BASOPHILS # BLD AUTO: 0.03 10*3/MM3 (ref 0–0.2)
BASOPHILS NFR BLD AUTO: 0.3 % (ref 0–1.5)
CYTOLOGIST CVX/VAG CYTO: NORMAL
CYTOLOGY CVX/VAG DOC CYTO: NORMAL
CYTOLOGY CVX/VAG DOC THIN PREP: NORMAL
DEPRECATED RDW RBC AUTO: 43.3 FL (ref 37–54)
DX ICD CODE: NORMAL
EOSINOPHIL # BLD AUTO: 0.38 10*3/MM3 (ref 0–0.4)
EOSINOPHIL NFR BLD AUTO: 4 % (ref 0.3–6.2)
ERYTHROCYTE [DISTWIDTH] IN BLOOD BY AUTOMATED COUNT: 14.4 % (ref 12.3–15.4)
HCT VFR BLD AUTO: 39.2 % (ref 34–46.6)
HGB BLD-MCNC: 12.7 G/DL (ref 12–15.9)
HIV 1 & 2 AB SER-IMP: NORMAL
HPV I/H RISK 4 DNA CVX QL PROBE+SIG AMP: NEGATIVE
IMM GRANULOCYTES # BLD AUTO: 0.02 10*3/MM3 (ref 0–0.05)
IMM GRANULOCYTES NFR BLD AUTO: 0.2 % (ref 0–0.5)
IRON 24H UR-MRATE: 38 MCG/DL (ref 37–145)
IRON SATN MFR SERPL: 8 % (ref 20–50)
LYMPHOCYTES # BLD AUTO: 2.28 10*3/MM3 (ref 0.7–3.1)
LYMPHOCYTES NFR BLD AUTO: 24.2 % (ref 19.6–45.3)
MCH RBC QN AUTO: 27 PG (ref 26.6–33)
MCHC RBC AUTO-ENTMCNC: 32.4 G/DL (ref 31.5–35.7)
MCV RBC AUTO: 83.4 FL (ref 79–97)
MONOCYTES # BLD AUTO: 0.48 10*3/MM3 (ref 0.1–0.9)
MONOCYTES NFR BLD AUTO: 5.1 % (ref 5–12)
NEUTROPHILS NFR BLD AUTO: 6.24 10*3/MM3 (ref 1.7–7)
NEUTROPHILS NFR BLD AUTO: 66.2 % (ref 42.7–76)
NRBC BLD AUTO-RTO: 0 /100 WBC (ref 0–0.2)
OTHER STN SPEC: NORMAL
PLATELET # BLD AUTO: 501 10*3/MM3 (ref 140–450)
PMV BLD AUTO: 10.1 FL (ref 6–12)
RBC # BLD AUTO: 4.7 10*6/MM3 (ref 3.77–5.28)
STAT OF ADQ CVX/VAG CYTO-IMP: NORMAL
T-UPTAKE NFR SERPL: 1.26 TBI (ref 0.8–1.3)
T4 SERPL-MCNC: 8.39 MCG/DL (ref 4.5–11.7)
TIBC SERPL-MCNC: 501 MCG/DL (ref 298–536)
TRANSFERRIN SERPL-MCNC: 336 MG/DL (ref 200–360)
TSH SERPL DL<=0.05 MIU/L-ACNC: 2.89 UIU/ML (ref 0.27–4.2)
WBC NRBC COR # BLD: 9.43 10*3/MM3 (ref 3.4–10.8)

## 2022-11-12 PROCEDURE — 84436 ASSAY OF TOTAL THYROXINE: CPT

## 2022-11-12 PROCEDURE — 36415 COLL VENOUS BLD VENIPUNCTURE: CPT

## 2022-11-12 PROCEDURE — 84466 ASSAY OF TRANSFERRIN: CPT

## 2022-11-12 PROCEDURE — 83540 ASSAY OF IRON: CPT

## 2022-11-12 PROCEDURE — 84479 ASSAY OF THYROID (T3 OR T4): CPT

## 2022-11-12 PROCEDURE — 85025 COMPLETE CBC W/AUTO DIFF WBC: CPT

## 2022-11-12 PROCEDURE — 84443 ASSAY THYROID STIM HORMONE: CPT

## 2022-11-15 DIAGNOSIS — D50.9 IRON DEFICIENCY ANEMIA, UNSPECIFIED IRON DEFICIENCY ANEMIA TYPE: Primary | ICD-10-CM

## 2022-11-15 DIAGNOSIS — K51.00 CHRONIC PANCOLONIC ULCERATIVE COLITIS: ICD-10-CM

## 2023-01-31 ENCOUNTER — TELEPHONE (OUTPATIENT)
Dept: GASTROENTEROLOGY | Facility: CLINIC | Age: 23
End: 2023-01-31
Payer: COMMERCIAL

## 2023-01-31 NOTE — TELEPHONE ENCOUNTER
Spoke with patient regarding overdue results. Patient stated that she will try to have the labs done by this weekend.

## 2023-03-21 NOTE — TELEPHONE ENCOUNTER
Spoke with patient today regarding over due labs. Patient stated that she didn't want to be non compliant but wanted to know why she needed additional lab work when her last results were normal, I advised patient that the repeat labs were to monitor her levels. Patient is heading to [place] for a  but stated she would try to get the labs done when she returns. Deferring labs out for 1 month.

## 2023-05-23 ENCOUNTER — OFFICE VISIT (OUTPATIENT)
Dept: FAMILY MEDICINE CLINIC | Facility: CLINIC | Age: 23
End: 2023-05-23
Payer: COMMERCIAL

## 2023-05-23 VITALS
DIASTOLIC BLOOD PRESSURE: 82 MMHG | OXYGEN SATURATION: 99 % | SYSTOLIC BLOOD PRESSURE: 126 MMHG | BODY MASS INDEX: 38.52 KG/M2 | WEIGHT: 231.2 LBS | TEMPERATURE: 98.2 F | HEIGHT: 65 IN | HEART RATE: 97 BPM

## 2023-05-23 DIAGNOSIS — M62.830 SPASM OF MUSCLE OF LOWER BACK: ICD-10-CM

## 2023-05-23 DIAGNOSIS — E66.09 CLASS 2 OBESITY DUE TO EXCESS CALORIES WITHOUT SERIOUS COMORBIDITY WITH BODY MASS INDEX (BMI) OF 38.0 TO 38.9 IN ADULT: ICD-10-CM

## 2023-05-23 DIAGNOSIS — F41.9 ANXIETY: Primary | ICD-10-CM

## 2023-05-23 PROBLEM — E66.812 CLASS 2 OBESITY DUE TO EXCESS CALORIES WITHOUT SERIOUS COMORBIDITY WITH BODY MASS INDEX (BMI) OF 38.0 TO 38.9 IN ADULT: Status: ACTIVE | Noted: 2023-05-23

## 2023-05-23 RX ORDER — TIZANIDINE 4 MG/1
4 TABLET ORAL NIGHTLY PRN
Qty: 30 TABLET | Refills: 1 | Status: SHIPPED | OUTPATIENT
Start: 2023-05-23

## 2023-05-23 NOTE — ASSESSMENT & PLAN NOTE
Patient's (Body mass index is 38.47 kg/m².) indicates that they are obese (BMI >30) with health conditions that include none . Weight is unchanged. BMI  is above average; BMI management plan is completed. We discussed portion control and increasing exercise.

## 2023-05-23 NOTE — PROGRESS NOTES
Chief Complaint  Chief Complaint   Patient presents with   • Depression     Med refills    • Anxiety     Med refills        Subjective      Lyn Darling presents to [facility]  History of Present Illness  Depression/anxiety: Stable on current dose of Zoloft 50 mg daily.    Ulcerative Colitis/Iron deficiency: Diagnosed with UC in 2015 and sees gastroenterology regularly.  She is currently taking mesalamine daily as well as Imuran daily. She is also currently taking an over-the-counter slow release iron supplement 2-3 times weekly. She has follow up with gastroenterology on 7/10/2023 to continue monitoring these issues.  She denies any abdominal pain or rectal bleeding.    PHQ-9 Depression Screening  Little interest or pleasure in doing things? 0-->not at all   Feeling down, depressed, or hopeless? 0-->not at all   Trouble falling or staying asleep, or sleeping too much?     Feeling tired or having little energy?     Poor appetite or overeating?     Feeling bad about yourself - or that you are a failure or have let yourself or your family down?     Trouble concentrating on things, such as reading the newspaper or watching television?     Moving or speaking so slowly that other people could have noticed? Or the opposite - being so fidgety or restless that you have been moving around a lot more than usual?     Thoughts that you would be better off dead, or of hurting yourself in some way?     PHQ-9 Total Score 0   If you checked off any problems, how difficult have these problems made it for you to do your work, take care of things at home, or get along with other people?           IAN-7  Feeling nervous, anxious or on edge: Several days  Not being able to stop or control worrying: Not at all  Worrying too much about different things: Not at all  Trouble Relaxing: Several days  Being so restless that it is hard to sit still: Not at all  Feeling afraid as if something awful  might happen: Not at all  Becoming easily annoyed or irritable: Several days  IAN 7 Total Score: 3  If you checked any problems, how difficult have these problems made it for you to do your work, take care of things at home, or get along with other people: Not difficult at all    Objective     Medical History:  Past Medical History:   Diagnosis Date   • Anxiety 12/11/2020   • Eczema    • Iron deficiency anemia, unspecified 12/11/2020   • Kidney stones    • Ulcerative colitis 12/11/2020     Past Surgical History:   Procedure Laterality Date   • COLONOSCOPY  2015 2021      Social History     Tobacco Use   • Smoking status: Never     Passive exposure: Yes   • Smokeless tobacco: Never   Vaping Use   • Vaping Use: Never used   Substance Use Topics   • Alcohol use: Never   • Drug use: Never     Family History   Problem Relation Age of Onset   • Breast cancer Maternal Grandmother    • Colon cancer Maternal Grandfather    • Deep vein thrombosis Neg Hx    • Pulmonary embolism Neg Hx    • Prostate cancer Neg Hx    • Melanoma Neg Hx    • Ovarian cancer Neg Hx    • Uterine cancer Neg Hx        Medications:  Prior to Admission medications    Medication Sig Start Date End Date Taking? Authorizing Provider   azaTHIOprine (IMURAN) 50 MG tablet Take 1 tablet by mouth Daily. 7/11/22  Yes Denisha Bazan APRN   carbonyl iron (FEOSOL) 45 MG tablet tablet Take  by mouth Daily.   Yes Michoacano Cabrera MD   levonorgestrel-ethinyl estradiol (Falmina) 0.1-20 MG-MCG per tablet Take 1 tablet by mouth Daily. 11/7/22  Yes Sheree Llamas MD   Loratadine (CLARITIN PO) Take  by mouth.   Yes ProviderMichoacano MD   mesalamine (APRISO) 0.375 g 24 hr capsule Take 4 capsules by mouth every morning 7/11/22  Yes Denisha Bazan APRN   sertraline (ZOLOFT) 50 MG tablet Take 1 tablet by mouth Daily. 8/1/22  Yes Joyce Rodriguez APRN        Allergies:   Patient has no known allergies.    Health Maintenance Due   Topic Date Due   • HEPATITIS C  "SCREENING  Never done   • ANNUAL PHYSICAL  Never done   • CHLAMYDIA SCREENING  Never done   • COLORECTAL CANCER SCREENING  04/16/2023         Vital Signs:   /82   Pulse 97   Temp 98.2 °F (36.8 °C)   Ht 165.1 cm (65\")   Wt 105 kg (231 lb 3.2 oz)   SpO2 99%   BMI 38.47 kg/m²     Wt Readings from Last 3 Encounters:   05/23/23 105 kg (231 lb 3.2 oz)   11/07/22 103 kg (227 lb)   08/01/22 99.2 kg (218 lb 9.6 oz)     BP Readings from Last 3 Encounters:   05/23/23 126/82   11/07/22 139/83   08/01/22 118/76       Class 2 Severe Obesity (BMI >=35 and <=39.9). Obesity-related health conditions include the following: none. Obesity is unchanged. BMI is is above average; BMI management plan is completed. We discussed portion control and increasing exercise.       Physical Exam  Vitals reviewed.   Constitutional:       Appearance: Normal appearance. She is well-developed. She is obese.   HENT:      Head: Normocephalic and atraumatic.   Eyes:      Conjunctiva/sclera: Conjunctivae normal.      Pupils: Pupils are equal, round, and reactive to light.   Cardiovascular:      Rate and Rhythm: Normal rate and regular rhythm.      Heart sounds: No murmur heard.    No friction rub. No gallop.   Pulmonary:      Effort: Pulmonary effort is normal.      Breath sounds: Normal breath sounds. No wheezing or rhonchi.   Abdominal:      General: Bowel sounds are normal. There is no distension.      Palpations: Abdomen is soft.      Tenderness: There is no abdominal tenderness.   Skin:     General: Skin is warm and dry.   Neurological:      Mental Status: She is alert and oriented to person, place, and time.      Cranial Nerves: No cranial nerve deficit.   Psychiatric:         Mood and Affect: Mood and affect normal.         Behavior: Behavior normal.         Thought Content: Thought content normal.         Judgment: Judgment normal.          Result Review :    The following data was reviewed by CELIA Slater on 05/23/23 at " 09:28 EDT:    Common labs        7/16/2022    11:11 11/12/2022    10:46   Common Labs   Glucose 87      BUN 7      Creatinine 0.68      Sodium 138      Potassium 4.2      Chloride 105      Calcium 8.9      Albumin 4.00      Total Bilirubin 0.2      Alkaline Phosphatase 102      AST (SGOT) 15      ALT (SGPT) 16      WBC 8.04   9.43     Hemoglobin 11.4   12.7     Hematocrit 36.0   39.2     Platelets 441   501         No Images in the past 120 days found..    Data reviewed: Consultant notes from gastroenterology for pan ulcerative colitis, iron deficiency anemia.              Assessment and Plan    Diagnoses and all orders for this visit:    1. Anxiety (Primary)  -     sertraline (ZOLOFT) 50 MG tablet; Take 1 tablet by mouth Daily.  Dispense: 90 tablet; Refill: 2    2. Spasm of muscle of lower back  -     tiZANidine (ZANAFLEX) 4 MG tablet; Take 1 tablet by mouth At Night As Needed for Muscle Spasms.  Dispense: 30 tablet; Refill: 1    3. Class 2 obesity due to excess calories without serious comorbidity with body mass index (BMI) of 38.0 to 38.9 in adult  Assessment & Plan:  Patient's (Body mass index is 38.47 kg/m².) indicates that they are obese (BMI >30) with health conditions that include none . Weight is unchanged. BMI  is above average; BMI management plan is completed. We discussed portion control and increasing exercise.     Patient to follow-up with gastroenterology in July for ongoing monitoring and management of ulcerative colitis and iron deficiency anemia.  Labs to be monitored by Gastro at time of follow up. Patient provided refills on Zoloft for treatment of depression/anxiety and tizanidine for as needed use for treatment of muscle spasms of low back pain with left hip sciatica.        Smoking Cessation:    Lyn Darling  reports that she has never smoked. She has been exposed to tobacco smoke. She has never used smokeless tobacco.            Follow Up   Return in about 6 months (around  11/23/2023) for Next scheduled follow up, Annual physical.  Patient was given instructions and counseling regarding her condition or for health maintenance advice. Please see specific information pulled into the AVS if appropriate.     Please note that portions of this note were completed with a voice recognition program.

## 2023-08-14 ENCOUNTER — TELEPHONE (OUTPATIENT)
Dept: GASTROENTEROLOGY | Facility: CLINIC | Age: 23
End: 2023-08-14
Payer: COMMERCIAL

## 2023-08-14 DIAGNOSIS — D50.9 IRON DEFICIENCY ANEMIA, UNSPECIFIED IRON DEFICIENCY ANEMIA TYPE: ICD-10-CM

## 2023-08-14 DIAGNOSIS — K51.00 CHRONIC PANCOLONIC ULCERATIVE COLITIS: Primary | ICD-10-CM

## 2023-08-14 NOTE — TELEPHONE ENCOUNTER
Caller: GARIMA LIEBERMAN     Best call back number: [phone]    Patient is needing: PT IS REQUESTING ORDER FOR LABS BE RE-ENTERED/RE-OPENED SO SHE CAN GET THE LABS DONE BEFORE HER APPT. WITH SALONI GARCIA SCHEDULED FOR 8/22.

## 2023-08-14 NOTE — TELEPHONE ENCOUNTER
Lab orders placed. S/w patient, patient aware of have drawn before f/u 08/22/2023. Patient will call us with any questions or concerns

## 2023-08-19 ENCOUNTER — LAB (OUTPATIENT)
Dept: LAB | Facility: HOSPITAL | Age: 23
End: 2023-08-19
Payer: COMMERCIAL

## 2023-08-19 DIAGNOSIS — D50.9 IRON DEFICIENCY ANEMIA, UNSPECIFIED IRON DEFICIENCY ANEMIA TYPE: ICD-10-CM

## 2023-08-19 DIAGNOSIS — K21.9 GASTROESOPHAGEAL REFLUX DISEASE, UNSPECIFIED WHETHER ESOPHAGITIS PRESENT: ICD-10-CM

## 2023-08-19 DIAGNOSIS — K51.00 CHRONIC PANCOLONIC ULCERATIVE COLITIS: ICD-10-CM

## 2023-08-19 LAB
BASOPHILS # BLD AUTO: 0.03 10*3/MM3 (ref 0–0.2)
BASOPHILS NFR BLD AUTO: 0.3 % (ref 0–1.5)
DEPRECATED RDW RBC AUTO: 42 FL (ref 37–54)
EOSINOPHIL # BLD AUTO: 0.5 10*3/MM3 (ref 0–0.4)
EOSINOPHIL NFR BLD AUTO: 5.6 % (ref 0.3–6.2)
ERYTHROCYTE [DISTWIDTH] IN BLOOD BY AUTOMATED COUNT: 13.5 % (ref 12.3–15.4)
HCT VFR BLD AUTO: 41.6 % (ref 34–46.6)
HGB BLD-MCNC: 14.1 G/DL (ref 12–15.9)
IMM GRANULOCYTES # BLD AUTO: 0.03 10*3/MM3 (ref 0–0.05)
IMM GRANULOCYTES NFR BLD AUTO: 0.3 % (ref 0–0.5)
IRON 24H UR-MRATE: 44 MCG/DL (ref 37–145)
IRON SATN MFR SERPL: 10 % (ref 20–50)
LYMPHOCYTES # BLD AUTO: 2.06 10*3/MM3 (ref 0.7–3.1)
LYMPHOCYTES NFR BLD AUTO: 23 % (ref 19.6–45.3)
MCH RBC QN AUTO: 29.4 PG (ref 26.6–33)
MCHC RBC AUTO-ENTMCNC: 33.9 G/DL (ref 31.5–35.7)
MCV RBC AUTO: 86.7 FL (ref 79–97)
MONOCYTES # BLD AUTO: 0.51 10*3/MM3 (ref 0.1–0.9)
MONOCYTES NFR BLD AUTO: 5.7 % (ref 5–12)
NEUTROPHILS NFR BLD AUTO: 5.82 10*3/MM3 (ref 1.7–7)
NEUTROPHILS NFR BLD AUTO: 65.1 % (ref 42.7–76)
NRBC BLD AUTO-RTO: 0 /100 WBC (ref 0–0.2)
PLATELET # BLD AUTO: 413 10*3/MM3 (ref 140–450)
PMV BLD AUTO: 10.3 FL (ref 6–12)
RBC # BLD AUTO: 4.8 10*6/MM3 (ref 3.77–5.28)
TIBC SERPL-MCNC: 443 MCG/DL (ref 298–536)
TRANSFERRIN SERPL-MCNC: 297 MG/DL (ref 200–360)
WBC NRBC COR # BLD: 8.95 10*3/MM3 (ref 3.4–10.8)

## 2023-08-19 PROCEDURE — 85025 COMPLETE CBC W/AUTO DIFF WBC: CPT

## 2023-08-19 PROCEDURE — 80053 COMPREHEN METABOLIC PANEL: CPT

## 2023-08-19 PROCEDURE — 36415 COLL VENOUS BLD VENIPUNCTURE: CPT

## 2023-08-19 PROCEDURE — 83540 ASSAY OF IRON: CPT

## 2023-08-19 PROCEDURE — 84466 ASSAY OF TRANSFERRIN: CPT

## 2023-08-22 ENCOUNTER — OFFICE VISIT (OUTPATIENT)
Dept: GASTROENTEROLOGY | Facility: CLINIC | Age: 23
End: 2023-08-22
Payer: COMMERCIAL

## 2023-08-22 VITALS
HEIGHT: 65 IN | OXYGEN SATURATION: 100 % | SYSTOLIC BLOOD PRESSURE: 102 MMHG | DIASTOLIC BLOOD PRESSURE: 62 MMHG | HEART RATE: 89 BPM | BODY MASS INDEX: 39.32 KG/M2 | WEIGHT: 236 LBS

## 2023-08-22 DIAGNOSIS — K21.9 GASTROESOPHAGEAL REFLUX DISEASE, UNSPECIFIED WHETHER ESOPHAGITIS PRESENT: ICD-10-CM

## 2023-08-22 DIAGNOSIS — D50.9 IRON DEFICIENCY ANEMIA, UNSPECIFIED IRON DEFICIENCY ANEMIA TYPE: ICD-10-CM

## 2023-08-22 DIAGNOSIS — K51.00 CHRONIC PANCOLONIC ULCERATIVE COLITIS: Primary | ICD-10-CM

## 2023-08-22 LAB
ALBUMIN SERPL-MCNC: 4.1 G/DL (ref 3.5–5.2)
ALBUMIN/GLOB SERPL: 1.4 G/DL
ALP SERPL-CCNC: 89 U/L (ref 39–117)
ALT SERPL W P-5'-P-CCNC: 15 U/L (ref 1–33)
ANION GAP SERPL CALCULATED.3IONS-SCNC: 17 MMOL/L (ref 5–15)
AST SERPL-CCNC: 14 U/L (ref 1–32)
BILIRUB SERPL-MCNC: 0.3 MG/DL (ref 0–1.2)
BUN SERPL-MCNC: 10 MG/DL (ref 6–20)
BUN/CREAT SERPL: 16.9 (ref 7–25)
CALCIUM SPEC-SCNC: 8.8 MG/DL (ref 8.6–10.5)
CHLORIDE SERPL-SCNC: 106 MMOL/L (ref 98–107)
CO2 SERPL-SCNC: 18 MMOL/L (ref 22–29)
CREAT SERPL-MCNC: 0.59 MG/DL (ref 0.57–1)
EGFRCR SERPLBLD CKD-EPI 2021: 128.4 ML/MIN/1.73
GLOBULIN UR ELPH-MCNC: 3 GM/DL
GLUCOSE SERPL-MCNC: 76 MG/DL (ref 65–99)
POTASSIUM SERPL-SCNC: 4.2 MMOL/L (ref 3.5–5.2)
PROT SERPL-MCNC: 7.1 G/DL (ref 6–8.5)
SODIUM SERPL-SCNC: 141 MMOL/L (ref 136–145)

## 2023-08-22 PROCEDURE — 99214 OFFICE O/P EST MOD 30 MIN: CPT | Performed by: NURSE PRACTITIONER

## 2023-08-22 RX ORDER — FAMOTIDINE 20 MG/1
20 TABLET, FILM COATED ORAL
Qty: 30 TABLET | Refills: 2 | Status: SHIPPED | OUTPATIENT
Start: 2023-08-22

## 2023-08-22 RX ORDER — SOD SULF/POT CHLORIDE/MAG SULF 1.479 G
12 TABLET ORAL TAKE AS DIRECTED
Qty: 24 TABLET | Refills: 0 | Status: SHIPPED | OUTPATIENT
Start: 2023-08-22

## 2023-08-22 NOTE — PROGRESS NOTES
Chief Complaint   1yr follow up    History of Present Illness       Lyn Darling is a 23 y.o. female who presents to [facility]  for follow-up with a history of pan ulcerative colitis.  Patient was diagnosed with pan ulcerative colitis in 2015 and she follows up annually.  She continues on Apriso 4 tablets daily and Imuran 50 mg.  Overall patient is doing really well she having 1-2 normal bowel movement per day.  Patient had recent CBC and iron profile which we reviewed in office.  Patient reports intermittent reflux which she is taking Pepcid over-the-counter.  Patient denies abdominal pain, fever, nausea, vomiting, weight loss, night sweats, melena, hematochezia, hematemesis.    Colonoscopy: Review of the patient's most recent colonoscopy performed by Dr. Guzman on 4/16/2021 displays diffuse ulceration granularity and erythema in the whole colon compatible with mild to moderate ulcerative colitis.  Patient was began on azathioprine and continued on Apriso.  CBC and CMP was ordered for 2 weeks 1 month and a repeat colonoscopy in April 2023.  Pathology with mild crypt architectural distortion and eosinophilic inflammation.     Most recent labs on 08.14.2023  Results       Result Review :   The following data was reviewed by: CELIA Aly on 08/22/2023:      CBC          11/12/2022    10:46 8/19/2023    11:05   CBC   WBC 9.43  8.95    RBC 4.70  4.80    Hemoglobin 12.7  14.1    Hematocrit 39.2  41.6    MCV 83.4  86.7    MCH 27.0  29.4    MCHC 32.4  33.9    RDW 14.4  13.5    Platelets 501  413        Iron Profile   Iron   Date Value Ref Range Status   08/19/2023 44 37 - 145 mcg/dL Final     TIBC   Date Value Ref Range Status   08/19/2023 443 298 - 536 mcg/dL Final     Iron Saturation (TSAT)   Date Value Ref Range Status   08/19/2023 10 (L) 20 - 50 % Final     Transferrin   Date Value Ref Range Status   08/19/2023 297 200 - 360 mg/dL Final     Ferritin   Ferritin   Date  Value Ref Range Status   07/16/2022 14.00 13.00 - 150.00 ng/mL Final               Past Medical History       Past Medical History:   Diagnosis Date    Anxiety 12/11/2020    Eczema     Iron deficiency anemia, unspecified 12/11/2020    Kidney stones     Ulcerative colitis 12/11/2020       Past Surgical History:   Procedure Laterality Date    COLONOSCOPY  2015 2021         Current Outpatient Medications:     azaTHIOprine (IMURAN) 50 MG tablet, Take 1 tablet by mouth Daily., Disp: 30 tablet, Rfl: 11    carbonyl iron (FEOSOL) 45 MG tablet tablet, Take  by mouth Daily., Disp: , Rfl:     levonorgestrel-ethinyl estradiol (Falmina) 0.1-20 MG-MCG per tablet, Take 1 tablet by mouth Daily., Disp: 28 tablet, Rfl: 11    Loratadine (CLARITIN PO), Take  by mouth., Disp: , Rfl:     mesalamine (APRISO) 0.375 g 24 hr capsule, Take 4 capsules by mouth every morning, Disp: 120 capsule, Rfl: 11    sertraline (ZOLOFT) 50 MG tablet, Take 1 tablet by mouth Daily., Disp: 90 tablet, Rfl: 2    tiZANidine (ZANAFLEX) 4 MG tablet, Take 1 tablet by mouth At Night As Needed for Muscle Spasms., Disp: 30 tablet, Rfl: 1    azaTHIOprine (IMURAN) 50 MG tablet, TAKE 1 TABLET BY MOUTH DAILY., Disp: 30 tablet, Rfl: 11    famotidine (Pepcid) 20 MG tablet, Take 1 tablet by mouth every night at bedtime., Disp: 30 tablet, Rfl: 2    mesalamine (APRISO) 0.375 g 24 hr capsule, TAKE 4 CAPSULES BY MOUTH EVERY MORNING, Disp: 120 capsule, Rfl: 11    Sodium Sulfate-Mag Sulfate-KCl (Sutab) 0195-225-589 MG tablet, Take 12 tablets by mouth Take As Directed., Disp: 24 tablet, Rfl: 0     No Known Allergies    Family History   Problem Relation Age of Onset    Breast cancer Maternal Grandmother     Colon cancer Maternal Grandfather     Deep vein thrombosis Neg Hx     Pulmonary embolism Neg Hx     Prostate cancer Neg Hx     Melanoma Neg Hx     Ovarian cancer Neg Hx     Uterine cancer Neg Hx         Social History     Social History Narrative    Not on file       Objective  "    Vital Signs:   /62 (BP Location: Right arm, Patient Position: Sitting, Cuff Size: Large Adult)   Pulse 89   Ht 165.1 cm (65\")   Wt 107 kg (236 lb)   SpO2 100%   BMI 39.27 kg/mý       Physical Exam  Constitutional:       General: She is not in acute distress.     Appearance: Normal appearance. She is well-developed and normal weight.   Eyes:      Conjunctiva/sclera: Conjunctivae normal.      Pupils: Pupils are equal, round, and reactive to light.      Visual Fields: Right eye visual fields normal and left eye visual fields normal.   Cardiovascular:      Rate and Rhythm: Normal rate and regular rhythm.      Heart sounds: Normal heart sounds.   Pulmonary:      Effort: Pulmonary effort is normal. No retractions.      Breath sounds: Normal breath sounds and air entry.      Comments: Inspection of chest: normal appearance  Abdominal:      General: Bowel sounds are normal.      Palpations: Abdomen is soft.      Tenderness: There is no abdominal tenderness.      Comments: No appreciable hepatosplenomegaly   Musculoskeletal:      Cervical back: Neck supple.      Right lower leg: No edema.      Left lower leg: No edema.   Lymphadenopathy:      Cervical: No cervical adenopathy.   Skin:     Findings: No lesion.      Comments: Turgor normal   Neurological:      Mental Status: She is alert and oriented to person, place, and time.   Psychiatric:         Mood and Affect: Mood and affect normal.         Assessment & Plan          Assessment and Plan    Diagnoses and all orders for this visit:    1. Chronic pancolonic ulcerative colitis (Primary)  -     Case Request; Standing  -     Obtain Informed Consent; Standing  -     Verify NPO; Standing  -     Verify Bowel Prep Was Successful; Standing  -     Give Tap Water Enema If Bowel Prep Insufficient; Standing  -     Case Request  -     Comprehensive Metabolic Panel; Future    2. Iron deficiency anemia, unspecified iron deficiency anemia type  -     Case Request; " Standing  -     Obtain Informed Consent; Standing  -     Verify NPO; Standing  -     Verify Bowel Prep Was Successful; Standing  -     Give Tap Water Enema If Bowel Prep Insufficient; Standing  -     Case Request  -     Comprehensive Metabolic Panel; Future    3. Gastroesophageal reflux disease, unspecified whether esophagitis present  -     Case Request; Standing  -     Obtain Informed Consent; Standing  -     Verify NPO; Standing  -     Verify Bowel Prep Was Successful; Standing  -     Give Tap Water Enema If Bowel Prep Insufficient; Standing  -     Case Request  -     Comprehensive Metabolic Panel; Future    Other orders  -     Sodium Sulfate-Mag Sulfate-KCl (Sutab) 1475-158-695 MG tablet; Take 12 tablets by mouth Take As Directed.  Dispense: 24 tablet; Refill: 0  -     famotidine (Pepcid) 20 MG tablet; Take 1 tablet by mouth every night at bedtime.  Dispense: 30 tablet; Refill: 2      23-year-old female presenting the office today for follow-up with a history of pan ulcerative colitis and reflux.  Patient was diagnosed with pan ulcerative colitis in 2015.  I have recommended that the patient undergo further evaluation with EGD and colonoscopy.  I have discussed this procedure in detail with the patient.  I have discussed the risks, benefits and alternatives.  I have discussed the risk of anesthesia, bleeding and perforation.  Patient understands these risks, benefits and alternatives and wishes to proceed.  I will schedule her at her earliest convenience.  Patient will continue Apriso, Imuran and add Pepcid once nightly.  Patient will follow-up in office after endoscopy.  I have ordered a CMP to be performed the day of her procedure.  Patient agreeable to this plan will call with any questions or concerns.            Follow Up       Follow Up   Return for Follow up after endoscopy in office.  Patient was given instructions and counseling regarding her condition or for health maintenance advice. Please see  specific information pulled into the AVS if appropriate.

## 2023-09-15 NOTE — PRE-PROCEDURE INSTRUCTIONS

## 2023-09-25 ENCOUNTER — ANESTHESIA EVENT (OUTPATIENT)
Dept: GASTROENTEROLOGY | Facility: HOSPITAL | Age: 23
End: 2023-09-25

## 2023-09-25 ENCOUNTER — TELEPHONE (OUTPATIENT)
Dept: GASTROENTEROLOGY | Facility: CLINIC | Age: 23
End: 2023-09-25

## 2023-09-25 ENCOUNTER — ANESTHESIA (OUTPATIENT)
Dept: GASTROENTEROLOGY | Facility: HOSPITAL | Age: 23
End: 2023-09-25

## 2023-09-25 ENCOUNTER — HOSPITAL ENCOUNTER (OUTPATIENT)
Facility: HOSPITAL | Age: 23
Setting detail: HOSPITAL OUTPATIENT SURGERY
Discharge: HOME | End: 2023-09-25
Attending: INTERNAL MEDICINE | Admitting: INTERNAL MEDICINE
Payer: COMMERCIAL

## 2023-09-25 VITALS
HEART RATE: 83 BPM | SYSTOLIC BLOOD PRESSURE: 106 MMHG | BODY MASS INDEX: 38.01 KG/M2 | OXYGEN SATURATION: 100 % | RESPIRATION RATE: 19 BRPM | TEMPERATURE: 97.1 F | WEIGHT: 228.4 LBS | DIASTOLIC BLOOD PRESSURE: 80 MMHG

## 2023-09-25 DIAGNOSIS — D50.9 IRON DEFICIENCY ANEMIA, UNSPECIFIED IRON DEFICIENCY ANEMIA TYPE: ICD-10-CM

## 2023-09-25 DIAGNOSIS — K51.00 CHRONIC PANCOLONIC ULCERATIVE COLITIS: ICD-10-CM

## 2023-09-25 DIAGNOSIS — K21.9 GASTROESOPHAGEAL REFLUX DISEASE, UNSPECIFIED WHETHER ESOPHAGITIS PRESENT: ICD-10-CM

## 2023-09-25 LAB — B-HCG UR QL: NEGATIVE

## 2023-09-25 PROCEDURE — 88305 TISSUE EXAM BY PATHOLOGIST: CPT | Performed by: INTERNAL MEDICINE

## 2023-09-25 PROCEDURE — 25010000002 PROPOFOL 10 MG/ML EMULSION: Performed by: NURSE ANESTHETIST, CERTIFIED REGISTERED

## 2023-09-25 PROCEDURE — 81025 URINE PREGNANCY TEST: CPT | Performed by: NURSE ANESTHETIST, CERTIFIED REGISTERED

## 2023-09-25 RX ORDER — SODIUM CHLORIDE, SODIUM LACTATE, POTASSIUM CHLORIDE, CALCIUM CHLORIDE 600; 310; 30; 20 MG/100ML; MG/100ML; MG/100ML; MG/100ML
30 INJECTION, SOLUTION INTRAVENOUS CONTINUOUS
Status: DISCONTINUED | OUTPATIENT
Start: 2023-09-25 | End: 2023-09-25 | Stop reason: HOSPADM

## 2023-09-25 RX ORDER — PROPOFOL 10 MG/ML
VIAL (ML) INTRAVENOUS AS NEEDED
Status: DISCONTINUED | OUTPATIENT
Start: 2023-09-25 | End: 2023-09-25 | Stop reason: SURG

## 2023-09-25 RX ORDER — LIDOCAINE HYDROCHLORIDE 20 MG/ML
INJECTION, SOLUTION EPIDURAL; INFILTRATION; INTRACAUDAL; PERINEURAL AS NEEDED
Status: DISCONTINUED | OUTPATIENT
Start: 2023-09-25 | End: 2023-09-25 | Stop reason: SURG

## 2023-09-25 RX ORDER — AZATHIOPRINE 50 MG/1
100 TABLET ORAL DAILY
Qty: 60 TABLET | Refills: 11 | Status: SHIPPED | OUTPATIENT
Start: 2023-09-25

## 2023-09-25 RX ADMIN — SODIUM CHLORIDE, POTASSIUM CHLORIDE, SODIUM LACTATE AND CALCIUM CHLORIDE 30 ML/HR: 600; 310; 30; 20 INJECTION, SOLUTION INTRAVENOUS at 12:52

## 2023-09-25 RX ADMIN — PROPOFOL 50 MG: 10 INJECTION, EMULSION INTRAVENOUS at 13:23

## 2023-09-25 RX ADMIN — PROPOFOL 175 MCG/KG/MIN: 10 INJECTION, EMULSION INTRAVENOUS at 13:23

## 2023-09-25 RX ADMIN — LIDOCAINE HYDROCHLORIDE 100 MG: 20 INJECTION, SOLUTION EPIDURAL; INFILTRATION; INTRACAUDAL; PERINEURAL at 13:23

## 2023-09-25 NOTE — ANESTHESIA PREPROCEDURE EVALUATION
Anesthesia Evaluation     Patient summary reviewed and Nursing notes reviewed   NPO Solid Status: > 8 hours  NPO Liquid Status: > 4 hours           Airway   Mallampati: II  TM distance: >3 FB  Neck ROM: full  No difficulty expected  Dental - normal exam     Pulmonary - normal exam    breath sounds clear to auscultation  Cardiovascular - normal exam  Exercise tolerance: good (4-7 METS)    Rhythm: regular  Rate: normal        Neuro/Psych  (+) psychiatric history Depression  GI/Hepatic/Renal/Endo    (+) obesity, morbid obesity, GERD well controlled, renal disease stones    Musculoskeletal     Abdominal  - normal exam   Substance History      OB/GYN          Other                        Anesthesia Plan    ASA 2     general   total IV anesthesia  intravenous induction     Anesthetic plan, risks, benefits, and alternatives have been provided, discussed and informed consent has been obtained with: patient and mother.  Pre-procedure education provided  Plan discussed with CRNA.    CODE STATUS:

## 2023-09-25 NOTE — H&P
Pre Procedure History & Physical    Chief Complaint:   Ulceratve colitis  Gerd    Subjective     HPI:   As above    Past Medical History:   Past Medical History:   Diagnosis Date    Anxiety 12/11/2020    Eczema     Iron deficiency anemia, unspecified 12/11/2020    Kidney stones     Ulcerative colitis 12/11/2020       Past Surgical History:  Past Surgical History:   Procedure Laterality Date    COLONOSCOPY  2015 2021    WISDOM TOOTH EXTRACTION N/A        Family History:  Family History   Problem Relation Age of Onset    Breast cancer Maternal Grandmother     Colon cancer Maternal Grandfather     Deep vein thrombosis Neg Hx     Pulmonary embolism Neg Hx     Prostate cancer Neg Hx     Melanoma Neg Hx     Ovarian cancer Neg Hx     Uterine cancer Neg Hx        Social History:   reports that she has never smoked. She has been exposed to tobacco smoke. She has never used smokeless tobacco. She reports that she does not drink alcohol and does not use drugs.    Medications:   Medications Prior to Admission   Medication Sig Dispense Refill Last Dose    azaTHIOprine (IMURAN) 50 MG tablet Take 1 tablet by mouth Daily. 30 tablet 11 9/24/2023    carbonyl iron (FEOSOL) 45 MG tablet tablet Take  by mouth Daily.   9/24/2023    cephalexin (KEFLEX) 500 MG capsule Take 1 capsule by mouth 3 (Three) Times a Day. 21 capsule 0 9/24/2023    famotidine (Pepcid) 20 MG tablet Take 1 tablet by mouth every night at bedtime. 30 tablet 2 Past Week    levonorgestrel-ethinyl estradiol (Falmina) 0.1-20 MG-MCG per tablet Take 1 tablet by mouth Daily. 28 tablet 11 9/24/2023    Loratadine (CLARITIN PO) Take  by mouth.   9/24/2023    mesalamine (APRISO) 0.375 g 24 hr capsule Take 4 capsules by mouth every morning 120 capsule 11 9/24/2023    sertraline (ZOLOFT) 50 MG tablet Take 1 tablet by mouth Daily. 90 tablet 2 9/24/2023    tiZANidine (ZANAFLEX) 4 MG tablet Take 1 tablet by mouth At Night As Needed for Muscle Spasms. 30 tablet 1 Past Week        Allergies:  Patient has no known allergies.        Objective     Blood pressure 112/79, pulse 96, temperature 98.9 °F (37.2 °C), temperature source Temporal, resp. rate 16, weight 104 kg (228 lb 6.3 oz), SpO2 96 %, not currently breastfeeding.    Physical Exam   Constitutional: Pt is oriented to person, place, and time and well-developed, well-nourished, and in no distress.   Mouth/Throat: Oropharynx is clear and moist.   Neck: Normal range of motion.   Cardiovascular: Normal rate, regular rhythm and normal heart sounds.    Pulmonary/Chest: Effort normal and breath sounds normal.   Abdominal: Soft. Nontender  Skin: Skin is warm and dry.   Psychiatric: Mood, memory, affect and judgment normal.     Assessment & Plan     Diagnosis:  Ulcerative colitis  gerd    Anticipated Surgical Procedure:  Egd  colonoscopy    The risks, benefits, and alternatives of this procedure have been discussed with the patient or the responsible party- the patient understands and agrees to proceed.

## 2023-09-25 NOTE — TELEPHONE ENCOUNTER
Caller: LEFTY    Relationship to patient: Other    Best call back number: [phone]     Type of visit: FOLLOW UP     Requested date: 6 WK FOLLOW UP NEEDED     If rescheduling, when is the original appointment: 11/21     Additional notes: LEFTY WITH ONE OF OUR  FACILITIES NEEDING TO SCHEDULE A 6 WK FOLLOW UP VISIT WITH DR GANN. THE NEXT AVAILABLE WAS 11/21 SO I SCHEDULED THE PT FOR THIS BUT DR GANN HAS REQUESTED TO SEE THE PATIENT IN 6 WKS. PLEASE CALL LEFTY TO GET THIS PATIENT RESCHEDULED. SHE CAN BE REACHED [phone].

## 2023-09-25 NOTE — ANESTHESIA POSTPROCEDURE EVALUATION
Patient: Lyn Darling    Procedure Summary       Date: 09/25/23 Room / Location: [facility] / [facility]    Anesthesia Start: 1322 Anesthesia Stop: 1406    Procedures:       ESOPHAGOGASTRODUODENOSCOPY with biopsy      COLONOSCOPY with biopsy Diagnosis:       Chronic pancolonic ulcerative colitis      Iron deficiency anemia, unspecified iron deficiency anemia type      Gastroesophageal reflux disease, unspecified whether esophagitis present      (Chronic pancolonic ulcerative colitis [K51.00])      (Iron deficiency anemia, unspecified iron deficiency anemia type [D50.9])      (Gastroesophageal reflux disease, unspecified whether esophagitis present [K21.9])    Surgeons: Leno Guzman MD Provider: Sindi Medina CRNA    Anesthesia Type: general ASA Status: 2            Anesthesia Type: general    Vitals  Vitals Value Taken Time   /80 09/25/23 1428   Temp 36.2 °C (97.1 °F) 09/25/23 1428   Pulse 83 09/25/23 1430   Resp 19 09/25/23 1428   SpO2 100 % 09/25/23 1429   Vitals shown include unvalidated device data.        Anesthesia Post Evaluation

## 2023-09-27 DIAGNOSIS — K51.00 CHRONIC PANCOLONIC ULCERATIVE COLITIS: Primary | ICD-10-CM

## 2023-09-27 LAB
CYTO UR: NORMAL
LAB AP CASE REPORT: NORMAL
LAB AP CLINICAL INFORMATION: NORMAL
PATH REPORT.FINAL DX SPEC: NORMAL
PATH REPORT.GROSS SPEC: NORMAL

## 2023-09-28 ENCOUNTER — TELEPHONE (OUTPATIENT)
Dept: GASTROENTEROLOGY | Facility: CLINIC | Age: 23
End: 2023-09-28
Payer: COMMERCIAL

## 2023-09-28 NOTE — TELEPHONE ENCOUNTER
----- Message from CELIA Thornton sent at 9/27/2023 12:19 PM EDT -----  I have reviewed the patients upper endoscopy and pathology.  Stomach biopsies benign.  Biopsies are negative for H. Pylori, dysplasia, metaplasia, and malignancy.    I reviewed the patient's most recent colonoscopy and pathology report.  Biopsies show chronic colitis with minimal activity in the rectum, sigmoid, transverse, and ascending colon.  Biopsies negative for granulomas and dysplasia.  Patient will need repeat colonoscopy in 1 year for surveillance.  Please place in recall.  Please advise patient to continue Apriso 4 tablets daily and increase Imuran to 100 mg daily.  Patient will need repeat CBC and CMP in 1 month, orders placed.    Maintain follow-up as scheduled.

## 2023-09-28 NOTE — TELEPHONE ENCOUNTER
Spoke with pt. Notified of results. Encouraged to keep f/u appt. Voiced understanding. Price    1yr Colon recall and care gap placed.

## 2023-10-02 ENCOUNTER — LAB (OUTPATIENT)
Dept: LAB | Facility: HOSPITAL | Age: 23
End: 2023-10-02
Payer: COMMERCIAL

## 2023-10-02 ENCOUNTER — TELEPHONE (OUTPATIENT)
Dept: GASTROENTEROLOGY | Facility: CLINIC | Age: 23
End: 2023-10-02
Payer: COMMERCIAL

## 2023-10-02 DIAGNOSIS — R19.7 DIARRHEA, UNSPECIFIED TYPE: ICD-10-CM

## 2023-10-02 DIAGNOSIS — R19.7 DIARRHEA, UNSPECIFIED TYPE: Primary | ICD-10-CM

## 2023-10-02 LAB
027 TOXIN: NORMAL
C DIFF TOX GENS STL QL NAA+PROBE: NEGATIVE

## 2023-10-02 PROCEDURE — 87493 C DIFF AMPLIFIED PROBE: CPT

## 2023-10-02 NOTE — TELEPHONE ENCOUNTER
I spoke with Ms Darling, stated she had egd/colon last Monday. She believes the prep has started her on a UC Flare..  bloody,loose stools about 8-9 times a day, abdomen cramps all over, symptoms started after she scope last Monday.    Per Denisha Bazan, CELIA  do stool study for C.Diff.  if negative will send in Budesonide Rx.     Pt voiced understanding.  marlee

## 2023-10-03 ENCOUNTER — TELEPHONE (OUTPATIENT)
Dept: GASTROENTEROLOGY | Facility: CLINIC | Age: 23
End: 2023-10-03
Payer: COMMERCIAL

## 2023-10-03 RX ORDER — BUDESONIDE 3 MG/1
9 CAPSULE, COATED PELLETS ORAL DAILY
Qty: 90 CAPSULE | Refills: 0 | Status: SHIPPED | OUTPATIENT
Start: 2023-10-03 | End: 2023-11-02

## 2023-10-03 NOTE — TELEPHONE ENCOUNTER
----- Message from CELIA Aly sent at 10/3/2023  7:58 AM EDT -----  Negative C. difficile. Budesonide sent to pharmacy, take 3 tablets daily for 1 month.  Maintain follow-up for November.

## 2023-10-12 ENCOUNTER — TELEPHONE (OUTPATIENT)
Dept: OBSTETRICS AND GYNECOLOGY | Facility: CLINIC | Age: 23
End: 2023-10-12

## 2023-10-12 DIAGNOSIS — Z30.41 ENCOUNTER FOR SURVEILLANCE OF CONTRACEPTIVE PILLS: ICD-10-CM

## 2023-10-12 RX ORDER — TIMOLOL MALEATE 5 MG/ML
1 SOLUTION/ DROPS OPHTHALMIC DAILY
Qty: 28 TABLET | Refills: 2 | Status: SHIPPED | OUTPATIENT
Start: 2023-10-12

## 2023-10-12 NOTE — TELEPHONE ENCOUNTER
DELETE AFTER REVIEWING: Send the encounter HIGH priority, If patient has less than a 3 day supply. If the patient will run out of medication over the weekend add that information to the additional details line. Send this encounter to the clinical pool.    Caller: Lyn Darling    Relationship: Self    Best call back number: [phone]    Requested Prescriptions: levonorgestrel-ethinyl estradiol (Falmina) 0.1-20 MG-MCG per tablet [[id number]]   Requested Prescriptions      No prescriptions requested or ordered in this encounter        Pharmacy where request should be sent:  [facility]      Last office visit with prescribing clinician: 11/7/2022   Last telemedicine visit with prescribing clinician: Visit date not found   Next office visit with prescribing clinician: 2/8/2024     Additional details provided by patient: PT HAS ANNUAL SCHEDULED FOR FEB 2024 NEEDS TO GET THIS EXTENDED TO GET HER THRU TIL THEN    Does the patient have less than a 3 day supply:  [] Yes  [x] No    Would you like a call back once the refill request has been completed: [x] Yes [] No    If the office needs to give you a call back, can they leave a voicemail: [x] Yes [] No    William Sanchez Rep   10/12/23 11:20 EDT

## 2023-10-12 NOTE — TELEPHONE ENCOUNTER
Approved three refills on pateints birth control. Her last appt was 11/07/22. Next appointment is 02/08/2024.

## 2023-11-27 ENCOUNTER — TELEPHONE (OUTPATIENT)
Dept: GASTROENTEROLOGY | Facility: CLINIC | Age: 23
End: 2023-11-27
Payer: COMMERCIAL

## 2023-12-02 ENCOUNTER — LAB (OUTPATIENT)
Dept: LAB | Facility: HOSPITAL | Age: 23
End: 2023-12-02
Payer: COMMERCIAL

## 2023-12-02 DIAGNOSIS — K51.00 CHRONIC PANCOLONIC ULCERATIVE COLITIS: ICD-10-CM

## 2023-12-02 LAB
ALBUMIN SERPL-MCNC: 4 G/DL (ref 3.5–5.2)
ALBUMIN/GLOB SERPL: 1.1 G/DL
ALP SERPL-CCNC: 87 U/L (ref 39–117)
ALT SERPL W P-5'-P-CCNC: 11 U/L (ref 1–33)
ANION GAP SERPL CALCULATED.3IONS-SCNC: 9.9 MMOL/L (ref 5–15)
AST SERPL-CCNC: 12 U/L (ref 1–32)
BASOPHILS # BLD AUTO: 0.02 10*3/MM3 (ref 0–0.2)
BASOPHILS NFR BLD AUTO: 0.2 % (ref 0–1.5)
BILIRUB SERPL-MCNC: 0.4 MG/DL (ref 0–1.2)
BUN SERPL-MCNC: 9 MG/DL (ref 6–20)
BUN/CREAT SERPL: 14.5 (ref 7–25)
CALCIUM SPEC-SCNC: 8.9 MG/DL (ref 8.6–10.5)
CHLORIDE SERPL-SCNC: 105 MMOL/L (ref 98–107)
CO2 SERPL-SCNC: 23.1 MMOL/L (ref 22–29)
CREAT SERPL-MCNC: 0.62 MG/DL (ref 0.57–1)
DEPRECATED RDW RBC AUTO: 47.4 FL (ref 37–54)
EGFRCR SERPLBLD CKD-EPI 2021: 126.9 ML/MIN/1.73
EOSINOPHIL # BLD AUTO: 1 10*3/MM3 (ref 0–0.4)
EOSINOPHIL NFR BLD AUTO: 10 % (ref 0.3–6.2)
ERYTHROCYTE [DISTWIDTH] IN BLOOD BY AUTOMATED COUNT: 14.1 % (ref 12.3–15.4)
GLOBULIN UR ELPH-MCNC: 3.8 GM/DL
GLUCOSE SERPL-MCNC: 107 MG/DL (ref 65–99)
HCT VFR BLD AUTO: 40.4 % (ref 34–46.6)
HGB BLD-MCNC: 13.1 G/DL (ref 12–15.9)
IMM GRANULOCYTES # BLD AUTO: 0.03 10*3/MM3 (ref 0–0.05)
IMM GRANULOCYTES NFR BLD AUTO: 0.3 % (ref 0–0.5)
LYMPHOCYTES # BLD AUTO: 2.03 10*3/MM3 (ref 0.7–3.1)
LYMPHOCYTES NFR BLD AUTO: 20.3 % (ref 19.6–45.3)
MCH RBC QN AUTO: 29.7 PG (ref 26.6–33)
MCHC RBC AUTO-ENTMCNC: 32.4 G/DL (ref 31.5–35.7)
MCV RBC AUTO: 91.6 FL (ref 79–97)
MONOCYTES # BLD AUTO: 0.4 10*3/MM3 (ref 0.1–0.9)
MONOCYTES NFR BLD AUTO: 4 % (ref 5–12)
NEUTROPHILS NFR BLD AUTO: 6.51 10*3/MM3 (ref 1.7–7)
NEUTROPHILS NFR BLD AUTO: 65.2 % (ref 42.7–76)
NRBC BLD AUTO-RTO: 0 /100 WBC (ref 0–0.2)
PLATELET # BLD AUTO: 417 10*3/MM3 (ref 140–450)
PMV BLD AUTO: 9.9 FL (ref 6–12)
POTASSIUM SERPL-SCNC: 4.1 MMOL/L (ref 3.5–5.2)
PROT SERPL-MCNC: 7.8 G/DL (ref 6–8.5)
RBC # BLD AUTO: 4.41 10*6/MM3 (ref 3.77–5.28)
SODIUM SERPL-SCNC: 138 MMOL/L (ref 136–145)
WBC NRBC COR # BLD AUTO: 9.99 10*3/MM3 (ref 3.4–10.8)

## 2023-12-02 PROCEDURE — 36415 COLL VENOUS BLD VENIPUNCTURE: CPT

## 2023-12-02 PROCEDURE — 80053 COMPREHEN METABOLIC PANEL: CPT

## 2023-12-02 PROCEDURE — 85025 COMPLETE CBC W/AUTO DIFF WBC: CPT

## 2023-12-05 ENCOUNTER — TELEPHONE (OUTPATIENT)
Dept: GASTROENTEROLOGY | Facility: CLINIC | Age: 23
End: 2023-12-05
Payer: COMMERCIAL

## 2023-12-05 NOTE — TELEPHONE ENCOUNTER
----- Message from CELIA Aly sent at 12/4/2023 10:57 AM EST -----  CMP normal with normal kidney function and liver enzymes.  CBC normal with no anemia noted.

## 2023-12-14 RX ORDER — FAMOTIDINE 20 MG/1
20 TABLET, FILM COATED ORAL
Qty: 30 TABLET | Refills: 2 | Status: SHIPPED | OUTPATIENT
Start: 2023-12-14

## 2024-01-08 DIAGNOSIS — Z30.41 ENCOUNTER FOR SURVEILLANCE OF CONTRACEPTIVE PILLS: ICD-10-CM

## 2024-01-08 RX ORDER — TIMOLOL MALEATE 5 MG/ML
1 SOLUTION/ DROPS OPHTHALMIC DAILY
Qty: 28 TABLET | Refills: 0 | Status: SHIPPED | OUTPATIENT
Start: 2024-01-08

## 2024-01-29 ENCOUNTER — OFFICE VISIT (OUTPATIENT)
Dept: GASTROENTEROLOGY | Facility: CLINIC | Age: 24
End: 2024-01-29
Payer: COMMERCIAL

## 2024-01-29 VITALS
DIASTOLIC BLOOD PRESSURE: 81 MMHG | HEIGHT: 65 IN | OXYGEN SATURATION: 100 % | WEIGHT: 237 LBS | SYSTOLIC BLOOD PRESSURE: 126 MMHG | HEART RATE: 86 BPM | BODY MASS INDEX: 39.49 KG/M2

## 2024-01-29 DIAGNOSIS — D50.9 IRON DEFICIENCY ANEMIA, UNSPECIFIED IRON DEFICIENCY ANEMIA TYPE: ICD-10-CM

## 2024-01-29 DIAGNOSIS — K51.00 CHRONIC PANCOLONIC ULCERATIVE COLITIS: Primary | ICD-10-CM

## 2024-01-29 RX ORDER — MESALAMINE 0.38 G/1
CAPSULE, EXTENDED RELEASE ORAL
Qty: 120 CAPSULE | Refills: 11 | Status: SHIPPED | OUTPATIENT
Start: 2024-01-29

## 2024-01-29 RX ORDER — AZATHIOPRINE 50 MG/1
150 TABLET ORAL DAILY
Qty: 90 TABLET | Refills: 6 | Status: SHIPPED | OUTPATIENT
Start: 2024-01-29

## 2024-01-29 NOTE — PROGRESS NOTES
Chief Complaint   Endo follow up medication change     History of Present Illness       Lyn Darling is a 23 y.o. female who presents to [facility] for follow-up with a history of pan ulcerative colitis. Reviewed EGD and colonoscopy in office.  Patient was diagnosed with pan ulcerative colitis in 2015.  She continues on Apriso 4 tablets daily and Imuran 100 mg which was increased following colonoscopy in September of 2023.  Overall patient is doing really well she having 1-2 normal bowel movement per day.  Patient had recent CBC and CMP which we reviewed in office, following imuran increase.  Patient reports intermittent reflux which she is taking Pepcid over-the-counter.  Patient denies abdominal pain, fever, nausea, vomiting, weight loss, night sweats, melena, hematochezia, hematemesis.     Endoscopy: Review of the patient's most recent EGD and colonoscopy performed by Dr. Guzman on 09.25.2023 diffuse area of moderate bleeding congested erythematous and granular mucosa in the entire colon, terminal ileum normal.  Small hiatal hernia otherwise normal EGD    Most recent labs on 12.02.2023    Results       Result Review :   The following data was reviewed by: CELIA Aly on 01/29/2024:    CMP          8/19/2023    11:05 12/2/2023    11:20   CMP   Glucose 76  107    BUN 10  9    Creatinine 0.59  0.62    EGFR 128.4  126.9    Sodium 141  138    Potassium 4.2  4.1    Chloride 106  105    Calcium 8.8  8.9    Total Protein 7.1  7.8    Albumin 4.1  4.0    Globulin 3.0  3.8    Total Bilirubin 0.3  0.4    Alkaline Phosphatase 89  87    AST (SGOT) 14  12    ALT (SGPT) 15  11    Albumin/Globulin Ratio 1.4  1.1    BUN/Creatinine Ratio 16.9  14.5    Anion Gap 17.0  9.9      CBC          8/19/2023    11:05 12/2/2023    11:20   CBC   WBC 8.95  9.99    RBC 4.80  4.41    Hemoglobin 14.1  13.1    Hematocrit 41.6  40.4    MCV 86.7  91.6    MCH 29.4  29.7    MCHC 33.9  32.4    RDW 13.5   14.1    Platelets 413  417        Iron Profile   Iron   Date Value Ref Range Status   08/19/2023 44 37 - 145 mcg/dL Final     TIBC   Date Value Ref Range Status   08/19/2023 443 298 - 536 mcg/dL Final     Iron Saturation (TSAT)   Date Value Ref Range Status   08/19/2023 10 (L) 20 - 50 % Final     Transferrin   Date Value Ref Range Status   08/19/2023 297 200 - 360 mg/dL Final     Ferritin   Ferritin   Date Value Ref Range Status   07/16/2022 14.00 13.00 - 150.00 ng/mL Final               Past Medical History       Past Medical History:   Diagnosis Date    Anxiety 12/11/2020    Eczema     Iron deficiency anemia, unspecified 12/11/2020    Kidney stones     Ulcerative colitis 12/11/2020       Past Surgical History:   Procedure Laterality Date    COLONOSCOPY  2015 2021    COLONOSCOPY N/A 9/25/2023    Procedure: COLONOSCOPY with biopsy;  Surgeon: Leno Guzman MD;  Location: [facility] ENDOSCOPY;  Service: Gastroenterology;  Laterality: N/A;  colitis mild to moderate to the entire colon    ENDOSCOPY N/A 9/25/2023    Procedure: ESOPHAGOGASTRODUODENOSCOPY with biopsy;  Surgeon: Leno Guzman MD;  Location: [facility] ENDOSCOPY;  Service: Gastroenterology;  Laterality: N/A;  hiattal hernia    WISDOM TOOTH EXTRACTION N/A          Current Outpatient Medications:     azaTHIOprine (IMURAN) 50 MG tablet, Take 3 tablets by mouth Daily., Disp: 90 tablet, Rfl: 6    carbonyl iron (FEOSOL) 45 MG tablet tablet, Take  by mouth Daily., Disp: , Rfl:     famotidine (PEPCID) 20 MG tablet, TAKE 1 TABLET BY MOUTH EVERY NIGHT AT BEDTIME., Disp: 30 tablet, Rfl: 2    Loratadine (CLARITIN PO), Take  by mouth., Disp: , Rfl:     mesalamine (APRISO) 0.375 g 24 hr capsule, Take 4 capsules by mouth every morning, Disp: 120 capsule, Rfl: 11    sertraline (ZOLOFT) 50 MG tablet, Take 1 tablet by mouth Daily., Disp: 90 tablet, Rfl: 2    tiZANidine (ZANAFLEX) 4 MG tablet, Take 1 tablet by mouth At Night As Needed for Muscle Spasms., Disp: 30  "tablet, Rfl: 1    Vienva 0.1-20 MG-MCG per tablet, TAKE 1 TABLET BY MOUTH DAILY., Disp: 28 tablet, Rfl: 0    amoxicillin-clavulanate (AUGMENTIN) 875-125 MG per tablet, Take 1 tablet by mouth 2 (Two) Times a Day., Disp: 20 tablet, Rfl: 0     No Known Allergies    Family History   Problem Relation Age of Onset    Breast cancer Maternal Grandmother     Colon cancer Maternal Grandfather     Deep vein thrombosis Neg Hx     Pulmonary embolism Neg Hx     Prostate cancer Neg Hx     Melanoma Neg Hx     Ovarian cancer Neg Hx     Uterine cancer Neg Hx         Social History     Social History Narrative    Not on file       Objective     Vital Signs:   /81 (BP Location: Right arm, Patient Position: Sitting, Cuff Size: Adult)   Pulse 86   Ht 165.1 cm (65\")   Wt 108 kg (237 lb)   SpO2 100%   BMI 39.44 kg/m²       Physical Exam  Constitutional:       Appearance: Normal appearance.   Pulmonary:      Effort: Pulmonary effort is normal.   Neurological:      General: No focal deficit present.      Mental Status: She is alert and oriented to person, place, and time.   Psychiatric:         Mood and Affect: Mood normal.         Behavior: Behavior normal.           Assessment & Plan          Assessment and Plan    Diagnoses and all orders for this visit:    1. Chronic pancolonic ulcerative colitis (Primary)  -     CBC & Differential; Future  -     Comprehensive Metabolic Panel; Future    2. Iron deficiency anemia, unspecified iron deficiency anemia type  -     CBC & Differential; Future  -     Comprehensive Metabolic Panel; Future    Other orders  -     azaTHIOprine (IMURAN) 50 MG tablet; Take 3 tablets by mouth Daily.  Dispense: 90 tablet; Refill: 6  -     mesalamine (APRISO) 0.375 g 24 hr capsule; Take 4 capsules by mouth every morning  Dispense: 120 capsule; Refill: 11      23-year-old female presents to the office today for follow-up with a history of pan ulcerative colitis. Reviewed EGD and colonoscopy in office.  Patient " was diagnosed with pan ulcerative colitis in 2015.  She continues on Apriso 4 tablets daily and Imuran 100 mg which was increased following colonoscopy in September of 2023.  We will further increase the Imuran to 150 mg at this time with repeat CBC and CMP in 2 weeks.  Patient will follow-up in 3 to 4 months.  Patient agreeable to this plan and will call with any questions or concerns.          Follow Up       Follow Up   Return in about 3 months (around 4/29/2024).  Patient was given instructions and counseling regarding her condition or for health maintenance advice. Please see specific information pulled into the AVS if appropriate.

## 2024-02-07 NOTE — PROGRESS NOTES
Well Woman Visit    CC: Scheduled annual well gyn visit  Chief Complaint   Patient presents with    Annual Exam       Myriad intake in the past?: No    DONE TODAY DID NOT QUALIFY TODAY    Contraception:  Birth control pill    HPI:   23 y.o.     Menses:   q 28 days, lasts 5 days, changes products q 4-6 hrs on heaviest days.     Pain:  Mild, OTC meds control discomfort    PCP: does manage PMHx and preventative labs  History: PMHx, Meds, Allergies, PSHx, Social Hx, and POBHx all reviewed and updated.    Pt has no complaints today.    PHYSICAL EXAM:  /79   Pulse 87   Wt 106 kg (233 lb)   LMP 2024 (Exact Date)   Breastfeeding No   BMI 38.77 kg/m²  Not found.  General- NAD, alert and oriented, appropriate  Psych- Normal mood, good memory  Neck- No masses, no thyroid enlargement  CV- Regular rhythm, no murnurs  Resp- CTA to bases, no wheezes  Abdomen- Soft, non distended, non tender, no masses    Breast left-  Bilaterally symmetrical, no masses, non tender, no nipple discharge  Breast right- Bilaterally symmetrical, no masses, non tender, no nipple discharge    External genitalia- Normal female, no lesions  Urethra/meatus- Normal, no masses, non tender  Bladder- Normal, no masses, non tender  Vagina- Normal, no atrophy, no lesions, no discharge.    Cvx- Normal, no lesions, no discharge, No cervical motion tenderness  Uterus- Normal size, shape & consistency.  Non tender, mobile.  Adnexa- No mass, non tender  Anus/Rectum/Perineum- Not performed    Lymphatic- No palpable neck, axillary, or groin nodes  Ext- No edema, no cyanosis    Skin- No lesions, no rashes, no acanthosis nigricans      ASSESSMENT and PLAN:    Diagnoses and all orders for this visit:    1. Well woman exam with routine gynecological exam (Primary)    2. Encounter for surveillance of contraceptive pills  -     Vienva 0.1-20 MG-MCG per tablet; Take 1 tablet by mouth Daily.  Dispense: 28 tablet; Refill: 0        Preventative:  CERVICAL  CANCER Screening- Reviewed current ASCCP guidelines for screening w and wo cotest HPV, age specific.  Screen: Updated today  Follow up PCP/Specialist PMHx and/or Labs      She understands the importance of having any ordered tests to be performed in a timely fashion.  The risks of not performing them include, but are not limited to, advanced cancer stages, bone loss from osteoporosis and/or subsequent increase in morbidity and/or mortality.  She is encouraged to review her results online and/or contact or office if she has questions.     Follow Up:  Return in about 1 year (around 2/8/2025) for Annual physical.            Sheree Llamas MD  02/08/2024    [facility]  [facility] [place] [address]  Dept: [phone]  Dept Fax: [phone]  Loc: [phone]  Loc Fax: [phone]

## 2024-02-08 ENCOUNTER — OFFICE VISIT (OUTPATIENT)
Dept: OBSTETRICS AND GYNECOLOGY | Facility: CLINIC | Age: 24
End: 2024-02-08
Payer: COMMERCIAL

## 2024-02-08 VITALS
WEIGHT: 233 LBS | BODY MASS INDEX: 38.77 KG/M2 | DIASTOLIC BLOOD PRESSURE: 79 MMHG | SYSTOLIC BLOOD PRESSURE: 119 MMHG | HEART RATE: 87 BPM

## 2024-02-08 DIAGNOSIS — Z30.41 ENCOUNTER FOR SURVEILLANCE OF CONTRACEPTIVE PILLS: ICD-10-CM

## 2024-02-08 DIAGNOSIS — Z01.419 WELL WOMAN EXAM WITH ROUTINE GYNECOLOGICAL EXAM: Primary | ICD-10-CM

## 2024-02-08 PROCEDURE — G0123 SCREEN CERV/VAG THIN LAYER: HCPCS

## 2024-02-08 RX ORDER — TIMOLOL MALEATE 5 MG/ML
1 SOLUTION/ DROPS OPHTHALMIC DAILY
Qty: 28 TABLET | Refills: 0 | Status: SHIPPED | OUTPATIENT
Start: 2024-02-08 | End: 2024-02-08 | Stop reason: SDUPTHER

## 2024-02-08 RX ORDER — TIMOLOL MALEATE 5 MG/ML
1 SOLUTION/ DROPS OPHTHALMIC DAILY
Qty: 28 TABLET | Refills: 0 | OUTPATIENT
Start: 2024-02-08

## 2024-02-08 RX ORDER — TIMOLOL MALEATE 5 MG/ML
1 SOLUTION/ DROPS OPHTHALMIC DAILY
Qty: 28 TABLET | Refills: 11 | Status: SHIPPED | OUTPATIENT
Start: 2024-02-08

## 2024-02-08 NOTE — TELEPHONE ENCOUNTER
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 I have resent to that to them.

## 2024-02-13 LAB
CONV .: NORMAL
CYTOLOGIST CVX/VAG CYTO: NORMAL
CYTOLOGY CVX/VAG DOC CYTO: NORMAL
CYTOLOGY CVX/VAG DOC THIN PREP: NORMAL
DX ICD CODE: NORMAL
HIV 1 & 2 AB SER-IMP: NORMAL
OTHER STN SPEC: NORMAL
STAT OF ADQ CVX/VAG CYTO-IMP: NORMAL

## 2024-03-04 ENCOUNTER — TELEPHONE (OUTPATIENT)
Dept: GASTROENTEROLOGY | Facility: CLINIC | Age: 24
End: 2024-03-04
Payer: COMMERCIAL

## 2024-03-09 ENCOUNTER — LAB (OUTPATIENT)
Dept: LAB | Facility: HOSPITAL | Age: 24
End: 2024-03-09
Payer: COMMERCIAL

## 2024-03-09 DIAGNOSIS — K51.00 CHRONIC PANCOLONIC ULCERATIVE COLITIS: ICD-10-CM

## 2024-03-09 DIAGNOSIS — D50.9 IRON DEFICIENCY ANEMIA, UNSPECIFIED IRON DEFICIENCY ANEMIA TYPE: ICD-10-CM

## 2024-03-09 LAB
ALBUMIN SERPL-MCNC: 4.1 G/DL (ref 3.5–5.2)
ALBUMIN/GLOB SERPL: 1.2 G/DL
ALP SERPL-CCNC: 97 U/L (ref 39–117)
ALT SERPL W P-5'-P-CCNC: 18 U/L (ref 1–33)
ANION GAP SERPL CALCULATED.3IONS-SCNC: 8 MMOL/L (ref 5–15)
AST SERPL-CCNC: 20 U/L (ref 1–32)
BASOPHILS # BLD AUTO: 0.02 10*3/MM3 (ref 0–0.2)
BASOPHILS NFR BLD AUTO: 0.3 % (ref 0–1.5)
BILIRUB SERPL-MCNC: 0.6 MG/DL (ref 0–1.2)
BUN SERPL-MCNC: 7 MG/DL (ref 6–20)
BUN/CREAT SERPL: 10.1 (ref 7–25)
CALCIUM SPEC-SCNC: 8.9 MG/DL (ref 8.6–10.5)
CHLORIDE SERPL-SCNC: 106 MMOL/L (ref 98–107)
CO2 SERPL-SCNC: 26 MMOL/L (ref 22–29)
CREAT SERPL-MCNC: 0.69 MG/DL (ref 0.57–1)
DEPRECATED RDW RBC AUTO: 46.7 FL (ref 37–54)
EGFRCR SERPLBLD CKD-EPI 2021: 123.7 ML/MIN/1.73
EOSINOPHIL # BLD AUTO: 0.42 10*3/MM3 (ref 0–0.4)
EOSINOPHIL NFR BLD AUTO: 5.7 % (ref 0.3–6.2)
ERYTHROCYTE [DISTWIDTH] IN BLOOD BY AUTOMATED COUNT: 14.4 % (ref 12.3–15.4)
GLOBULIN UR ELPH-MCNC: 3.5 GM/DL
GLUCOSE SERPL-MCNC: 106 MG/DL (ref 65–99)
HCT VFR BLD AUTO: 41.6 % (ref 34–46.6)
HGB BLD-MCNC: 13.6 G/DL (ref 12–15.9)
IMM GRANULOCYTES # BLD AUTO: 0.02 10*3/MM3 (ref 0–0.05)
IMM GRANULOCYTES NFR BLD AUTO: 0.3 % (ref 0–0.5)
LYMPHOCYTES # BLD AUTO: 1.73 10*3/MM3 (ref 0.7–3.1)
LYMPHOCYTES NFR BLD AUTO: 23.6 % (ref 19.6–45.3)
MCH RBC QN AUTO: 29.6 PG (ref 26.6–33)
MCHC RBC AUTO-ENTMCNC: 32.7 G/DL (ref 31.5–35.7)
MCV RBC AUTO: 90.6 FL (ref 79–97)
MONOCYTES # BLD AUTO: 0.33 10*3/MM3 (ref 0.1–0.9)
MONOCYTES NFR BLD AUTO: 4.5 % (ref 5–12)
NEUTROPHILS NFR BLD AUTO: 4.81 10*3/MM3 (ref 1.7–7)
NEUTROPHILS NFR BLD AUTO: 65.6 % (ref 42.7–76)
NRBC BLD AUTO-RTO: 0 /100 WBC (ref 0–0.2)
PLATELET # BLD AUTO: 476 10*3/MM3 (ref 140–450)
PMV BLD AUTO: 10 FL (ref 6–12)
POTASSIUM SERPL-SCNC: 4.4 MMOL/L (ref 3.5–5.2)
PROT SERPL-MCNC: 7.6 G/DL (ref 6–8.5)
RBC # BLD AUTO: 4.59 10*6/MM3 (ref 3.77–5.28)
SODIUM SERPL-SCNC: 140 MMOL/L (ref 136–145)
WBC NRBC COR # BLD AUTO: 7.33 10*3/MM3 (ref 3.4–10.8)

## 2024-03-09 PROCEDURE — 80053 COMPREHEN METABOLIC PANEL: CPT

## 2024-03-09 PROCEDURE — 36415 COLL VENOUS BLD VENIPUNCTURE: CPT

## 2024-03-09 PROCEDURE — 85025 COMPLETE CBC W/AUTO DIFF WBC: CPT

## 2024-03-13 ENCOUNTER — TELEPHONE (OUTPATIENT)
Dept: GASTROENTEROLOGY | Facility: CLINIC | Age: 24
End: 2024-03-13
Payer: COMMERCIAL

## 2024-03-13 NOTE — TELEPHONE ENCOUNTER
----- Message from CELIA Thornton sent at 3/12/2024  9:01 AM EDT -----  I have reviewed the patient's most recent labs.  CMP shows mild elevation in glucose with normal kidney function, electrolytes, and liver enzymes.  CBC is unremarkable.

## 2024-04-15 DIAGNOSIS — F41.9 ANXIETY: ICD-10-CM

## 2024-04-16 DIAGNOSIS — F41.9 ANXIETY: ICD-10-CM

## 2024-04-16 RX ORDER — FAMOTIDINE 20 MG/1
20 TABLET, FILM COATED ORAL
Qty: 90 TABLET | Refills: 2 | Status: SHIPPED | OUTPATIENT
Start: 2024-04-16

## 2024-04-16 NOTE — TELEPHONE ENCOUNTER
Caller: Lyn Darling    Relationship: Self    Best call back number: [phone]     Requested Prescriptions:   Requested Prescriptions     Pending Prescriptions Disp Refills    sertraline (ZOLOFT) 50 MG tablet 90 tablet 2     Sig: Take 1 tablet by mouth Daily.        Pharmacy where request should be sent: [address], SUITE [facility] - [phone]  - [phone]      Last office visit with prescribing clinician: 5/23/2023   Last telemedicine visit with prescribing clinician: Visit date not found   Next office visit with prescribing clinician: 5/16/2024     Additional details provided by patient:     Does the patient have less than a 3 day supply:  [] Yes  [x] No    Would you like a call back once the refill request has been completed: [] Yes [x] No    If the office needs to give you a call back, can they leave a voicemail: [] Yes [] No    William Taylor Rep   04/16/24 08:42 EDT

## 2024-04-22 DIAGNOSIS — F41.9 ANXIETY: ICD-10-CM

## 2024-05-16 ENCOUNTER — OFFICE VISIT (OUTPATIENT)
Dept: FAMILY MEDICINE CLINIC | Facility: CLINIC | Age: 24
End: 2024-05-16
Payer: COMMERCIAL

## 2024-05-16 VITALS
BODY MASS INDEX: 39.3 KG/M2 | SYSTOLIC BLOOD PRESSURE: 118 MMHG | OXYGEN SATURATION: 98 % | DIASTOLIC BLOOD PRESSURE: 70 MMHG | TEMPERATURE: 98 F | HEIGHT: 65 IN | HEART RATE: 97 BPM | WEIGHT: 235.9 LBS

## 2024-05-16 DIAGNOSIS — F41.9 ANXIETY: ICD-10-CM

## 2024-05-16 DIAGNOSIS — K51.919 ULCERATIVE COLITIS WITH COMPLICATION, UNSPECIFIED LOCATION: ICD-10-CM

## 2024-05-16 DIAGNOSIS — Z00.00 ANNUAL PHYSICAL EXAM: Primary | ICD-10-CM

## 2024-05-16 PROCEDURE — 99213 OFFICE O/P EST LOW 20 MIN: CPT

## 2024-05-16 PROCEDURE — 99395 PREV VISIT EST AGE 18-39: CPT

## 2024-05-16 NOTE — PROGRESS NOTES
Chief Complaint  Chief Complaint   Patient presents with    Annual Exam       Subjective      Lyn Darling presents to [facility]  History of Present Illness  The patient presents for an annual physical.    The patient, diagnosed with ulcerative colitis, is currently under the care of Dr. Guzman. Her current medication regimen includes daily mesalamine and Pepcid, both of which she takes daily. She has undergone a colonoscopy and an endoscopy in 09/2023 due to chronic UC and findings of heartburn and a small hiatal hernia prompted a repeat colonoscopy in 1 year. A follow-up colonoscopy is scheduled for this year. During her last colonoscopy, her Imuran dosage was escalated due to minimal improvement on previous dose. She experienced a flare-up during the last preparation process, a condition she had not previously encountered.    Supplemental Information  She takes over-the-counter allergy medications. She takes iron a few times a week.      Objective     Medical History:  Past Medical History:   Diagnosis Date    Anxiety 12/11/2020    Eczema     Iron deficiency anemia, unspecified 12/11/2020    Kidney stones     Ulcerative colitis 12/11/2020     Past Surgical History:   Procedure Laterality Date    COLONOSCOPY  2015 2021    COLONOSCOPY N/A 9/25/2023    Procedure: COLONOSCOPY with biopsy;  Surgeon: Leno Guzman MD;  Location: [facility] ENDOSCOPY;  Service: Gastroenterology;  Laterality: N/A;  colitis mild to moderate to the entire colon    ENDOSCOPY N/A 9/25/2023    Procedure: ESOPHAGOGASTRODUODENOSCOPY with biopsy;  Surgeon: Leno Guzman MD;  Location: [facility] ENDOSCOPY;  Service: Gastroenterology;  Laterality: N/A;  hiattal hernia    WISDOM TOOTH EXTRACTION N/A       Social History     Tobacco Use    Smoking status: Never     Passive exposure: Past    Smokeless tobacco: Never   Vaping Use    Vaping status: Never Used   Substance Use Topics    Alcohol use:  "Never    Drug use: Never     Family History   Problem Relation Age of Onset    Breast cancer Maternal Grandmother     Colon cancer Maternal Grandfather     Deep vein thrombosis Neg Hx     Pulmonary embolism Neg Hx     Prostate cancer Neg Hx     Melanoma Neg Hx     Ovarian cancer Neg Hx     Uterine cancer Neg Hx        Medications:  Prior to Admission medications    Medication Sig Start Date End Date Taking? Authorizing Provider   azaTHIOprine (IMURAN) 50 MG tablet Take 3 tablets by mouth Daily. 1/29/24  Yes Denisha Bazan APRN   carbonyl iron (FEOSOL) 45 MG tablet tablet Take  by mouth Daily.   Yes Michoacano Cabrera MD   famotidine (PEPCID) 20 MG tablet TAKE 1 TABLET BY MOUTH EVERY NIGHT AT BEDTIME. 4/16/24  Yes Caterina Christopher APRN   Loratadine (CLARITIN PO) Take  by mouth.   Yes Michoacano Cabrera MD   mesalamine (APRISO) 0.375 g 24 hr capsule Take 4 capsules by mouth every morning 1/29/24  Yes Denisha Bazan APRN   sertraline (ZOLOFT) 50 MG tablet Take 1 tablet by mouth Daily. 4/22/24  Yes Joyce Rodriguez APRN   tiZANidine (ZANAFLEX) 4 MG tablet Take 1 tablet by mouth At Night As Needed for Muscle Spasms. 5/23/23  Yes Joyce Rodriguez APRN   Vienva 0.1-20 MG-MCG per tablet Take 1 tablet by mouth Daily. 2/8/24  Yes Sheree Llamas MD   amoxicillin-clavulanate (AUGMENTIN) 875-125 MG per tablet Take 1 tablet by mouth 2 (Two) Times a Day. 2/1/24   Martha Mathis APRN        Allergies:   Patient has no known allergies.    Health Maintenance Due   Topic Date Due    HEPATITIS C SCREENING  Never done    ANNUAL PHYSICAL  Never done    COLORECTAL CANCER SCREENING  09/25/2024         Vital Signs:   /70 (BP Location: Left arm, Patient Position: Sitting, Cuff Size: Adult)   Pulse 97   Temp 98 °F (36.7 °C) (Oral)   Ht 165.1 cm (65\")   Wt 107 kg (235 lb 14.4 oz)   SpO2 98%   BMI 39.26 kg/m²     Wt Readings from Last 3 Encounters:   05/16/24 107 kg (235 lb 14.4 oz)   02/08/24 106 kg " (233 lb)   02/01/24 107 kg (236 lb)     BP Readings from Last 3 Encounters:   05/16/24 118/70   02/08/24 119/79   02/01/24 138/76       Physical Exam  Vitals reviewed.   Constitutional:       Appearance: Normal appearance. She is well-developed. She is obese.   HENT:      Head: Normocephalic and atraumatic.   Eyes:      Conjunctiva/sclera: Conjunctivae normal.      Pupils: Pupils are equal, round, and reactive to light.   Cardiovascular:      Rate and Rhythm: Normal rate and regular rhythm.      Heart sounds: No murmur heard.     No friction rub. No gallop.   Pulmonary:      Effort: Pulmonary effort is normal.      Breath sounds: Normal breath sounds. No wheezing or rhonchi.   Abdominal:      General: Bowel sounds are normal. There is no distension.      Palpations: Abdomen is soft.      Tenderness: There is no abdominal tenderness.   Skin:     General: Skin is warm and dry.   Neurological:      Mental Status: She is alert and oriented to person, place, and time.      Cranial Nerves: No cranial nerve deficit.   Psychiatric:         Mood and Affect: Mood and affect normal.         Behavior: Behavior normal.         Thought Content: Thought content normal.         Judgment: Judgment normal.       Physical Exam        Result Review :    The following data was reviewed by CELIA Slater on 05/16/24 at 09:38 EDT:    Common labs          8/19/2023    11:05 12/2/2023    11:20 3/9/2024    10:34   Common Labs   Glucose 76  107  106    BUN 10  9  7    Creatinine 0.59  0.62  0.69    Sodium 141  138  140    Potassium 4.2  4.1  4.4    Chloride 106  105  106    Calcium 8.8  8.9  8.9    Albumin 4.1  4.0  4.1    Total Bilirubin 0.3  0.4  0.6    Alkaline Phosphatase 89  87  97    AST (SGOT) 14  12  20    ALT (SGPT) 15  11  18    WBC 8.95  9.99  7.33    Hemoglobin 14.1  13.1  13.6    Hematocrit 41.6  40.4  41.6    Platelets 413  417  476        No Images in the past 120 days found..    Results  Laboratory Studies  Blood  counts and chemistry panel were normal.               Assessment and Plan    Diagnoses and all orders for this visit:    1. Annual physical exam (Primary)    2. Anxiety  -     sertraline (ZOLOFT) 50 MG tablet; Take 1 tablet by mouth Daily.  Dispense: 90 tablet; Refill: 3    3. Ulcerative colitis with complication, unspecified location       Assessment & Plan  1. Annual physical examination.  The patient's most recent Pap smear was conducted in 02/2024, and she is current with her screenings. She is not yet eligible for a mammogram. Recent laboratory results are within normal limits.     2.  Anxiety  A prescription for a one-year supply of Zoloft has been issued.    3. Ulcerative Colitis  Continue medications as prescribed by gastroenterology.  Follow-up with gastroenterology as scheduled and plan for repeat EGD and colonoscopy for monitoring.        Smoking Cessation:    Lyn Darling  reports that she has never smoked. She has been exposed to tobacco smoke. She has never used smokeless tobacco.            Follow Up   Return in about 6 months (around 11/16/2024) for Next scheduled follow up.  Patient was given instructions and counseling regarding her condition or for health maintenance advice. Please see specific information pulled into the AVS if appropriate.     Please note that portions of this note were completed with a voice recognition program.    Patient or patient representative verbalized consent for the use of Ambient Listening during the visit with  CELIA Slater for chart documentation. 5/16/2024  09:37 EDT

## 2024-09-16 RX ORDER — AZATHIOPRINE 50 MG/1
150 TABLET ORAL DAILY
Qty: 90 TABLET | Refills: 6 | Status: SHIPPED | OUTPATIENT
Start: 2024-09-16

## 2024-10-01 ENCOUNTER — OFFICE VISIT (OUTPATIENT)
Dept: GASTROENTEROLOGY | Facility: CLINIC | Age: 24
End: 2024-10-01
Payer: COMMERCIAL

## 2024-10-01 VITALS
HEIGHT: 65 IN | OXYGEN SATURATION: 100 % | WEIGHT: 233 LBS | BODY MASS INDEX: 38.82 KG/M2 | SYSTOLIC BLOOD PRESSURE: 130 MMHG | HEART RATE: 78 BPM | DIASTOLIC BLOOD PRESSURE: 66 MMHG

## 2024-10-01 DIAGNOSIS — K51.011 ULCERATIVE PANCOLITIS WITH RECTAL BLEEDING: Primary | ICD-10-CM

## 2024-10-01 DIAGNOSIS — K21.9 GASTROESOPHAGEAL REFLUX DISEASE WITHOUT ESOPHAGITIS: ICD-10-CM

## 2024-10-01 DIAGNOSIS — D50.8 OTHER IRON DEFICIENCY ANEMIA: ICD-10-CM

## 2024-10-01 RX ORDER — AZATHIOPRINE 50 MG/1
200 TABLET ORAL DAILY
Qty: 120 TABLET | Refills: 6 | Status: SHIPPED | OUTPATIENT
Start: 2024-10-01

## 2024-10-01 NOTE — PROGRESS NOTES
Chief Complaint    Lyn Darling is a 24 y.o. female who presents to [facility] for follow-up of pan ulcerative colitis diagnosed in 2015 at the age of approximately 15.  She has not had a persistent activity during her last colonoscopy in September 2023 and therefore her azathioprine was increased to 100 mg and 6 months ago it was increased to 150 mg.  She continues to tolerate it well.  She also takes Apriso 4 tablets daily.  She denies any rectal bleeding, diarrhea, abdominal pain, nausea or vomiting.  Overall she is happy with her progress.  Her most recent labs showed WBCs of 7.3 and hemoglobin of 13.6.  She works as a [employer] off of [employer].    Result Review :     The following data was reviewed by: Leno Guzman MD on 10/01/2024:    CMP          12/2/2023    11:20 3/9/2024    10:34   CMP   Glucose 107  106    BUN 9  7    Creatinine 0.62  0.69    EGFR 126.9  123.7    Sodium 138  140    Potassium 4.1  4.4    Chloride 105  106    Calcium 8.9  8.9    Total Protein 7.8  7.6    Albumin 4.0  4.1    Globulin 3.8  3.5    Total Bilirubin 0.4  0.6    Alkaline Phosphatase 87  97    AST (SGOT) 12  20    ALT (SGPT) 11  18    Albumin/Globulin Ratio 1.1  1.2    BUN/Creatinine Ratio 14.5  10.1    Anion Gap 9.9  8.0      CBC          12/2/2023    11:20 3/9/2024    10:34   CBC   WBC 9.99  7.33    RBC 4.41  4.59    Hemoglobin 13.1  13.6    Hematocrit 40.4  41.6    MCV 91.6  90.6    MCH 29.7  29.6    MCHC 32.4  32.7    RDW 14.1  14.4    Platelets 417  476        Data reviewed : GI studies colonoscopy reviewed from September 2023      Past Medical History:   Diagnosis Date    Anxiety 12/11/2020    Eczema     Iron deficiency anemia, unspecified 12/11/2020    Kidney stones     Ulcerative colitis 12/11/2020       Past Surgical History:   Procedure Laterality Date    COLONOSCOPY  2015 2021    COLONOSCOPY N/A 9/25/2023    Procedure: COLONOSCOPY with  "biopsy;  Surgeon: Leno Guzman MD;  Location: [facility] ENDOSCOPY;  Service: Gastroenterology;  Laterality: N/A;  colitis mild to moderate to the entire colon    ENDOSCOPY N/A 9/25/2023    Procedure: ESOPHAGOGASTRODUODENOSCOPY with biopsy;  Surgeon: Leno Guzman MD;  Location: [facility] ENDOSCOPY;  Service: Gastroenterology;  Laterality: N/A;  hiattal hernia    WISDOM TOOTH EXTRACTION N/A        Social History     Social History Narrative    Not on file       Objective     Vital Signs:   /66 (BP Location: Right arm, Patient Position: Sitting, Cuff Size: Adult)   Pulse 78   Ht 165.1 cm (65\")   Wt 106 kg (233 lb)   SpO2 100%   BMI 38.77 kg/m²     Body mass index is 38.77 kg/m².    Physical Exam            Assessment and Plan    Diagnoses and all orders for this visit:    1. Ulcerative pancolitis with rectal bleeding (Primary)    2. Gastroesophageal reflux disease without esophagitis    3. Other iron deficiency anemia    Overall the patient continues to do well although her azathioprine is still not up to 2 mg/kg.  I will therefore have her increase to 200 mg daily.  She will continue Apriso 4 tablets daily.  She will return to follow-up with us in 6 months.  We will send CBC and CMP in 1 month.  If she continues to do well we will plan repeat colonoscopy in September 2025, 2 years from her last to ensure mucosal healing with her current therapies              Follow Up   No follow-ups on file.  Patient was given instructions and counseling regarding her condition or for health maintenance advice. Please see specific information pulled into the AVS if appropriate.     "

## 2024-11-02 ENCOUNTER — LAB (OUTPATIENT)
Dept: LAB | Facility: HOSPITAL | Age: 24
End: 2024-11-02
Payer: COMMERCIAL

## 2024-11-02 DIAGNOSIS — K51.011 ULCERATIVE PANCOLITIS WITH RECTAL BLEEDING: ICD-10-CM

## 2024-11-02 DIAGNOSIS — D50.8 OTHER IRON DEFICIENCY ANEMIA: ICD-10-CM

## 2024-11-02 LAB
ALBUMIN SERPL-MCNC: 3.9 G/DL (ref 3.5–5.2)
ALBUMIN/GLOB SERPL: 1.1 G/DL
ALP SERPL-CCNC: 91 U/L (ref 39–117)
ALT SERPL W P-5'-P-CCNC: 17 U/L (ref 1–33)
ANION GAP SERPL CALCULATED.3IONS-SCNC: 11 MMOL/L (ref 5–15)
AST SERPL-CCNC: 15 U/L (ref 1–32)
BILIRUB SERPL-MCNC: 0.6 MG/DL (ref 0–1.2)
BUN SERPL-MCNC: 7 MG/DL (ref 6–20)
BUN/CREAT SERPL: 11.1 (ref 7–25)
CALCIUM SPEC-SCNC: 8.7 MG/DL (ref 8.6–10.5)
CHLORIDE SERPL-SCNC: 104 MMOL/L (ref 98–107)
CO2 SERPL-SCNC: 22 MMOL/L (ref 22–29)
CREAT SERPL-MCNC: 0.63 MG/DL (ref 0.57–1)
DEPRECATED RDW RBC AUTO: 49.4 FL (ref 37–54)
EGFRCR SERPLBLD CKD-EPI 2021: 125.7 ML/MIN/1.73
ERYTHROCYTE [DISTWIDTH] IN BLOOD BY AUTOMATED COUNT: 14.4 % (ref 12.3–15.4)
GLOBULIN UR ELPH-MCNC: 3.4 GM/DL
GLUCOSE SERPL-MCNC: 95 MG/DL (ref 65–99)
HCT VFR BLD AUTO: 39.1 % (ref 34–46.6)
HGB BLD-MCNC: 13.4 G/DL (ref 12–15.9)
MCH RBC QN AUTO: 32.7 PG (ref 26.6–33)
MCHC RBC AUTO-ENTMCNC: 34.3 G/DL (ref 31.5–35.7)
MCV RBC AUTO: 95.4 FL (ref 79–97)
PLATELET # BLD AUTO: 445 10*3/MM3 (ref 140–450)
PMV BLD AUTO: 10 FL (ref 6–12)
POTASSIUM SERPL-SCNC: 4 MMOL/L (ref 3.5–5.2)
PROT SERPL-MCNC: 7.3 G/DL (ref 6–8.5)
RBC # BLD AUTO: 4.1 10*6/MM3 (ref 3.77–5.28)
SODIUM SERPL-SCNC: 137 MMOL/L (ref 136–145)
WBC NRBC COR # BLD AUTO: 7.25 10*3/MM3 (ref 3.4–10.8)

## 2024-11-02 PROCEDURE — 80053 COMPREHEN METABOLIC PANEL: CPT

## 2024-11-02 PROCEDURE — 36415 COLL VENOUS BLD VENIPUNCTURE: CPT

## 2024-11-02 PROCEDURE — 85027 COMPLETE CBC AUTOMATED: CPT

## 2024-11-20 ENCOUNTER — TELEPHONE (OUTPATIENT)
Dept: GASTROENTEROLOGY | Facility: CLINIC | Age: 24
End: 2024-11-20
Payer: COMMERCIAL

## 2024-11-20 ENCOUNTER — PATIENT MESSAGE (OUTPATIENT)
Dept: GASTROENTEROLOGY | Facility: CLINIC | Age: 24
End: 2024-11-20
Payer: COMMERCIAL

## 2024-11-20 RX ORDER — ONDANSETRON 4 MG/1
4 TABLET, FILM COATED ORAL EVERY 8 HOURS PRN
Qty: 30 TABLET | Refills: 2 | Status: SHIPPED | OUTPATIENT
Start: 2024-11-20

## 2024-12-26 DIAGNOSIS — K51.00 CHRONIC PANCOLONIC ULCERATIVE COLITIS: ICD-10-CM

## 2024-12-26 DIAGNOSIS — K51.011 ULCERATIVE PANCOLITIS WITH RECTAL BLEEDING: Primary | ICD-10-CM

## 2024-12-26 DIAGNOSIS — K21.9 GASTROESOPHAGEAL REFLUX DISEASE WITHOUT ESOPHAGITIS: Primary | ICD-10-CM

## 2024-12-26 RX ORDER — FAMOTIDINE 20 MG/1
20 TABLET, FILM COATED ORAL
Qty: 30 TABLET | Refills: 2 | Status: SHIPPED | OUTPATIENT
Start: 2024-12-26

## 2024-12-26 RX ORDER — MESALAMINE 0.38 G/1
CAPSULE, EXTENDED RELEASE ORAL
Qty: 120 CAPSULE | Refills: 11 | Status: SHIPPED | OUTPATIENT
Start: 2024-12-26

## 2024-12-26 NOTE — TELEPHONE ENCOUNTER
Medication Requested MESALAMINE    Last Refill 12/10/2024    Last OV 10/1/2024    Next OV 4/1/2025    Medication pended for approval and correct pharmacy verified YES

## 2024-12-26 NOTE — TELEPHONE ENCOUNTER
Medication Requested FAMOTIDINE    Last Refill 12/10/2024    Last OV 10/1/2024    Next OV 4/1/2025    Medication pended for approval and correct pharmacy verified YES

## 2025-01-20 DIAGNOSIS — M62.830 SPASM OF MUSCLE OF LOWER BACK: ICD-10-CM

## 2025-02-04 ENCOUNTER — TELEPHONE (OUTPATIENT)
Dept: OBSTETRICS AND GYNECOLOGY | Facility: CLINIC | Age: 25
End: 2025-02-04
Payer: COMMERCIAL

## 2025-02-04 DIAGNOSIS — Z30.41 ENCOUNTER FOR SURVEILLANCE OF CONTRACEPTIVE PILLS: ICD-10-CM

## 2025-02-04 RX ORDER — TIMOLOL MALEATE 5 MG/ML
1 SOLUTION/ DROPS OPHTHALMIC DAILY
Qty: 84 TABLET | Refills: 1 | Status: SHIPPED | OUTPATIENT
Start: 2025-02-04

## 2025-02-04 NOTE — TELEPHONE ENCOUNTER
Lilliam'd refill on Vienva thru 7/25.  Last seen 2/8/24.  Next appointment 7/9/25.  Pt informed Rx @ pharmacy

## 2025-02-04 NOTE — TELEPHONE ENCOUNTER
Caller: Lyn Darling    Relationship: Self    Best call back number: [phone] (home)       Requested Prescriptions:   Requested Prescriptions     Pending Prescriptions Disp Refills    Vienva 0.1-20 MG-MCG per tablet 28 tablet 11     Sig: Take 1 tablet by mouth Daily.        Pharmacy where request should be sent:    [address], [address] [facility] - [phone] [facility] [phone]    Last office visit with prescribing clinician: 2/8/2024   Last telemedicine visit with prescribing clinician: Visit date not found   Next office visit with prescribing clinician: 7/9/2025     Additional details provided by patient: APPT SCHEDULED    Does the patient have less than a 3 day supply:  [] Yes  [x] No    Would you like a call back once the refill request has been completed: [] Yes [x] No    If the office needs to give you a call back, can they leave a voicemail: [x] Yes [] No    William Burger Rep   02/04/25 14:48 EST

## 2025-03-28 ENCOUNTER — TELEPHONE (OUTPATIENT)
Dept: ORTHOPEDIC SURGERY | Facility: CLINIC | Age: 25
End: 2025-03-28
Payer: COMMERCIAL

## 2025-04-01 ENCOUNTER — OFFICE VISIT (OUTPATIENT)
Dept: GASTROENTEROLOGY | Facility: CLINIC | Age: 25
End: 2025-04-01
Payer: COMMERCIAL

## 2025-04-01 ENCOUNTER — OFFICE VISIT (OUTPATIENT)
Dept: ORTHOPEDIC SURGERY | Facility: CLINIC | Age: 25
End: 2025-04-01
Payer: COMMERCIAL

## 2025-04-01 VITALS
OXYGEN SATURATION: 96 % | HEART RATE: 107 BPM | WEIGHT: 224 LBS | BODY MASS INDEX: 37.32 KG/M2 | DIASTOLIC BLOOD PRESSURE: 74 MMHG | SYSTOLIC BLOOD PRESSURE: 114 MMHG | HEIGHT: 65 IN

## 2025-04-01 VITALS
BODY MASS INDEX: 37.32 KG/M2 | HEART RATE: 100 BPM | HEIGHT: 65 IN | SYSTOLIC BLOOD PRESSURE: 102 MMHG | DIASTOLIC BLOOD PRESSURE: 79 MMHG | OXYGEN SATURATION: 100 % | WEIGHT: 224 LBS

## 2025-04-01 DIAGNOSIS — S82.832A CLOSED FRACTURE OF DISTAL END OF LEFT FIBULA, UNSPECIFIED FRACTURE MORPHOLOGY, INITIAL ENCOUNTER: ICD-10-CM

## 2025-04-01 DIAGNOSIS — K51.00 ULCERATIVE PANCOLITIS WITHOUT COMPLICATION: Primary | ICD-10-CM

## 2025-04-01 DIAGNOSIS — M25.572 LEFT ANKLE PAIN, UNSPECIFIED CHRONICITY: Primary | ICD-10-CM

## 2025-04-01 PROCEDURE — 99214 OFFICE O/P EST MOD 30 MIN: CPT

## 2025-04-01 RX ORDER — SODIUM PICOSULFATE, MAGNESIUM OXIDE, AND ANHYDROUS CITRIC ACID 12; 3.5; 1 G/175ML; G/175ML; MG/175ML
175 LIQUID ORAL TAKE AS DIRECTED
Qty: 175 ML | Refills: 0 | Status: SHIPPED | OUTPATIENT
Start: 2025-04-01

## 2025-04-01 NOTE — PROGRESS NOTES
"Chief Complaint  Initial Evaluation of the Left Ankle     Subjective      Lyn Darling presents to [facility] for initial evaluation of the left ankle.  She had a fall on 3/22/25.  She then babies it for a couple of days and went to  on 3/27/25.  She was placed in a CAM boot and using a knee scooter.  She has swelling and bruising to the left ankle.       No Known Allergies     Social History     Socioeconomic History    Marital status: Single   Tobacco Use    Smoking status: Never     Passive exposure: Past    Smokeless tobacco: Never   Vaping Use    Vaping status: Never Used   Substance and Sexual Activity    Alcohol use: Never    Drug use: Never    Sexual activity: Yes     Partners: Male     Birth control/protection: Birth control pill        I reviewed the patient's chief complaint, history of present illness, review of systems, past medical history, surgical history, family history, social history, medications, and allergy list.     Review of Systems     Constitutional: Denies fevers, chills, weight loss  Cardiovascular: Denies chest pain, shortness of breath  Skin: Denies rashes, acute skin changes  Neurologic: Denies headache, loss of consciousness        Vital Signs:   /74   Pulse 107   Ht 165.1 cm (65\")   Wt 102 kg (224 lb)   SpO2 96%   BMI 37.28 kg/m²          Physical Exam  General: Alert. No acute distress    Ortho Exam        LEFT ANKLE Positive EHL, FHL, GS and TA. Sensation intact to all 5 nerves of the foot. Positive pulses. Neurovascularly intact. Calf soft, Non-tender. Mildly full ROM of the ankle with swelling and stability.   Stable to stress. Tender to the lateral aspect of the ankle. Intact flexion and extension of toes.         Orthopedic Injury Treatment    Date/Time: 4/1/2025 9:03 AM    Performed by: Rhoda Molina  Authorized by: Tea Jaime MD  Injury location: ankle  Location details: left ankle  Injury type: fracture  Pre-procedure " neurovascular assessment: neurovascularly intact    Anesthesia:  Local anesthesia used: no    Sedation:  Patient sedated: no    Immobilization: cast (short leg)  Splint type: short leg  Supplies used: cotton padding (Fiberglass)  Post-procedure neurovascular assessment: post-procedure neurovascularly intact  Patient tolerance: patient tolerated the procedure well with no immediate complications  Comments: Closed treatment was obtained and fiberglass cast was applied.  The patient tolerated the procedure without any complications.          Imaging Results (Most Recent)       Procedure Component Value Units Date/Time    XR Ankle 3+ View Left [[id number]] Resulted: 04/01/25 0811     Updated: 04/01/25 0813             Result Review :     X-Ray Report:  Left ankle(s) X-Ray  Indication: Evaluation of the left ankle  AP/Lateral view(s)  Findings: Mildly displaced distal left fibular fracture with no further displacement from further imaging.   Prior studies available for comparison: yes       XR Ankle 3+ View Left  Result Date: 3/27/2025  Narrative: XR ANKLE 3+ VW LEFT Date of Exam: 3/27/2025 9:12 AM EDT Indication: fall, bruising medial/lateral ankles with soft tissue swelling, pain lateral Comparison: None available. Findings: Evaluation of the left ankle demonstrates a comminuted fracture involving the distal fibula which appears minimally displaced. The talar dome is intact. Lateral soft tissue swelling is noted.     Impression: Impression: Mildly displaced distal left fibular fracture with soft tissue swelling. Electronically Signed: Fabi Dupont MD  3/27/2025 9:30 AM EDT  Workstation ID: [id number]             Assessment and Plan     Diagnoses and all orders for this visit:    1. Left ankle pain, unspecified chronicity (Primary)  -     XR Ankle 3+ View Left    2. Closed fracture of distal end of left fibula, unspecified fracture morphology, initial encounter        Discussed the treatment plan with the patient. I  reviewed the X-rays that were obtained today with the patient.     Short leg cast.  Cast care was educated on.  Elevate above heart to reduce swelling.     NWB.  Can put foot down to stand still      Will obtain X-Rays of the left ankle at next visit after the cast is removed.      Call or return if worsening symptoms.    Follow Up     3 weeks with X ray after cast is removed and place back in CAM boot.        Patient was given instructions and counseling regarding her condition or for health maintenance advice. Please see specific information pulled into the AVS if appropriate.     Scribed for Tea Jaime MD by Jade Rice MA.  04/01/25   08:04 EDT    I have personally performed the services described in this document as scribed by the above individual and it is both accurate and complete. Tea Jaime MD 04/01/25

## 2025-04-01 NOTE — PROGRESS NOTES
Chief Complaint  Follow-up and  Ulcerative pancolitis with rectal bleeding (Primary)    Patient or patient representative verbalized consent for the use of Ambient Listening during the visit with  CELIA Thornton for chart documentation. 4/1/2025  13:05 EDT    Lyn Darling is a 24 y.o. female who presents to [facility] [place] for UC follow up      History of Present Illness  The patient is a 24-year-old female who presents to the office for an ulcerative colitis follow-up. She was diagnosed with pan-ulcerative colitis in 2015. She has continued to manage UC with Imuran 100 mg daily and Apriso 4 tablets daily. The patient had worsening nausea and vomiting when increasing Imuran to 200 mg therefore she decreased back down to 100.  When taking this regimen she has about 2-3 normal bowel movements daily.  Denies abdominal pain, melena, and hematochezia.  Denies upper GI symptoms.      Gastrointestinal History  EGD/Colonoscopy 9/25/23 by Dr. Guzman -normal esophagus, small hiatal hernia, normal stomach and duodenum.  Moderate inflammation throughout colon. Biopsies show chronic colitis. Imuran increase to 100 mg daily and continue Apriso 4 tablets daily.  Repeat 1 year    Past Medical History:   Diagnosis Date    Anxiety 12/11/2020    Eczema     Iron deficiency anemia, unspecified 12/11/2020    Kidney stones     Ulcerative colitis 12/11/2020       Past Surgical History:   Procedure Laterality Date    COLONOSCOPY  2015 2021    COLONOSCOPY N/A 9/25/2023    Procedure: COLONOSCOPY with biopsy;  Surgeon: Leno Guzman MD;  Location: [facility];  Service: Gastroenterology;  Laterality: N/A;  colitis mild to moderate to the entire colon    ENDOSCOPY N/A 9/25/2023    Procedure: ESOPHAGOGASTRODUODENOSCOPY with biopsy;  Surgeon: Leno Guzman MD;  Location: [facility] ENDOSCOPY;  Service: Gastroenterology;  Laterality: N/A;  hiattal hernia    WISDOM TOOTH  "EXTRACTION N/A          Current Outpatient Medications:     azaTHIOprine (IMURAN) 50 MG tablet, Take 4 tablets by mouth Daily., Disp: 120 tablet, Rfl: 6    carbonyl iron (FEOSOL) 45 MG tablet tablet, Take  by mouth Daily., Disp: , Rfl:     famotidine (PEPCID) 20 MG tablet, TAKE 1 TABLET BY MOUTH EVERY NIGHT AT BEDTIME., Disp: 30 tablet, Rfl: 2    Loratadine (CLARITIN PO), Take  by mouth., Disp: , Rfl:     mesalamine (APRISO) 0.375 g 24 hr capsule, TAKE 4 CAPSULES BY MOUTH EVERY MORNING, Disp: 120 capsule, Rfl: 11    ondansetron (Zofran) 4 MG tablet, Take 1 tablet by mouth Every 8 (Eight) Hours As Needed for Nausea or Vomiting., Disp: 30 tablet, Rfl: 2    sertraline (ZOLOFT) 50 MG tablet, Take 1 tablet by mouth Daily., Disp: 90 tablet, Rfl: 3    tiZANidine (ZANAFLEX) 4 MG tablet, Take 1 tablet by mouth At Night As Needed for Muscle Spasms., Disp: 30 tablet, Rfl: 1    Vienva 0.1-20 MG-MCG per tablet, Take 1 tablet by mouth Daily., Disp: 84 tablet, Rfl: 1    Sod Picosulfate-Mag Ox-Cit Acd (Clenpiq) 10-3.5-12 MG-GM -GM/175ML solution, Take 175 mL by mouth Take As Directed. Please follow colon prep instructions provided by office., Disp: 175 mL, Rfl: 0     No Known Allergies    Family History   Problem Relation Age of Onset    Breast cancer Maternal Grandmother     Colon cancer Maternal Grandfather     Deep vein thrombosis Neg Hx     Pulmonary embolism Neg Hx     Prostate cancer Neg Hx     Melanoma Neg Hx     Ovarian cancer Neg Hx     Uterine cancer Neg Hx     Esophageal cancer Neg Hx         Social History     Social History Narrative    Not on file       Objective       Vital Signs:   /79 (BP Location: Left arm, Patient Position: Sitting, Cuff Size: Adult)   Pulse 100   Ht 165.1 cm (65\")   Wt 102 kg (224 lb)   SpO2 100%   BMI 37.28 kg/m²       Physical Exam  Constitutional:       Appearance: Normal appearance. She is normal weight.   HENT:      Head: Normocephalic and atraumatic.      Nose: Nose normal. "   Pulmonary:      Effort: Pulmonary effort is normal.   Skin:     General: Skin is warm and dry.   Neurological:      Mental Status: She is alert and oriented to person, place, and time. Mental status is at baseline.   Psychiatric:         Mood and Affect: Mood normal.         Behavior: Behavior normal.         Thought Content: Thought content normal.         Judgment: Judgment normal.         Result Review :       CBC w/diff          11/2/2024    11:33   CBC w/Diff   WBC 7.25    RBC 4.10    Hemoglobin 13.4    Hematocrit 39.1    MCV 95.4    MCH 32.7    MCHC 34.3    RDW 14.4    Platelets 445      CMP          11/2/2024    11:33   CMP   Glucose 95    BUN 7    Creatinine 0.63    EGFR 125.7    Sodium 137    Potassium 4.0    Chloride 104    Calcium 8.7    Total Protein 7.3    Albumin 3.9    Globulin 3.4    Total Bilirubin 0.6    Alkaline Phosphatase 91    AST (SGOT) 15    ALT (SGPT) 17    Albumin/Globulin Ratio 1.1    BUN/Creatinine Ratio 11.1    Anion Gap 11.0                    Assessment and Plan    Diagnoses and all orders for this visit:    1. Ulcerative pancolitis without complication (Primary)  -     CBC & Differential; Future  -     Comprehensive Metabolic Panel; Future  -     Sedimentation Rate; Future  -     C-reactive Protein; Future  -     Case Request; Standing  -     Follow Anesthesia Guidelines / Protocol; Standing  -     Follow Anesthesia Guidelines / Protocol; Future  -     Verify NPO; Standing  -     Verify Bowel Prep Was Successful; Standing  -     Give Tap Water Enema If Bowel Prep Insufficient; Standing  -     Case Request    Other orders  -     Sod Picosulfate-Mag Ox-Cit Acd (Clenpiq) 10-3.5-12 MG-GM -GM/175ML solution; Take 175 mL by mouth Take As Directed. Please follow colon prep instructions provided by office.  Dispense: 175 mL; Refill: 0      Assessment & Plan  Ulcerative colitis  Patient continues Imuran 100 mg and Apriso 4 tablets daily with adequate control of ulcerative colitis.  When  increasing dose of Imuran she developed worsening nausea and vomiting therefore she decreased back down to 2 tablets daily.  Reports having 2-3 normal bowel movements daily.  Patient will complete labs listed above.  She is also due for colonoscopy in September 2025, we will schedule at her convenience.  Risk associate with procedure thoroughly discussed.  Patient will continue following up every 6 months.    COLONOSCOPY  EXAMINATION OF LARGE BOWEL WITH INSERTION OF LIGHTED INSTRUMENT TO VIEW THE LARGE INTESTINE POSSIBLE REMOVAL OF PROTRUDED TISSUE TO SEND FOR TESTING (N/A)        Follow Up   Return in about 6 months (around 10/1/2025).  Patient was given instructions and counseling regarding her condition or for health maintenance advice. Please see specific information pulled into the AVS if appropriate.

## 2025-04-04 DIAGNOSIS — K21.9 GASTROESOPHAGEAL REFLUX DISEASE WITHOUT ESOPHAGITIS: ICD-10-CM

## 2025-04-04 RX ORDER — FAMOTIDINE 20 MG/1
20 TABLET, FILM COATED ORAL
Qty: 30 TABLET | Refills: 3 | Status: SHIPPED | OUTPATIENT
Start: 2025-04-04

## 2025-04-24 ENCOUNTER — OFFICE VISIT (OUTPATIENT)
Dept: ORTHOPEDIC SURGERY | Facility: CLINIC | Age: 25
End: 2025-04-24
Payer: COMMERCIAL

## 2025-04-24 VITALS
SYSTOLIC BLOOD PRESSURE: 120 MMHG | DIASTOLIC BLOOD PRESSURE: 77 MMHG | WEIGHT: 224 LBS | BODY MASS INDEX: 37.32 KG/M2 | HEIGHT: 65 IN | OXYGEN SATURATION: 98 % | HEART RATE: 91 BPM

## 2025-04-24 DIAGNOSIS — S82.832D CLOSED FRACTURE OF DISTAL END OF LEFT FIBULA WITH ROUTINE HEALING, UNSPECIFIED FRACTURE MORPHOLOGY, SUBSEQUENT ENCOUNTER: Primary | ICD-10-CM

## 2025-04-24 NOTE — PROGRESS NOTES
"Chief Complaint  Follow-up of the Left Ankle     Subjective      Lyn Darling presents to [facility] for follow up of the left ankle.  She had a fall on 3/22/25.  She was seen in office on 4/1/25 and was placed in a short leg cast.  The cast was removed in office today 4/24/25.  She has been using a knee scooter.  The bruising and swelling has decreased.  She works in a pharmacy.      No Known Allergies     Social History     Socioeconomic History    Marital status: Single   Tobacco Use    Smoking status: Never     Passive exposure: Past    Smokeless tobacco: Never   Vaping Use    Vaping status: Never Used   Substance and Sexual Activity    Alcohol use: Never    Drug use: Never    Sexual activity: Yes     Partners: Male     Birth control/protection: Birth control pill        I reviewed the patient's chief complaint, history of present illness, review of systems, past medical history, surgical history, family history, social history, medications, and allergy list.     Review of Systems     Constitutional: Denies fevers, chills, weight loss  Cardiovascular: Denies chest pain, shortness of breath  Skin: Denies rashes, acute skin changes  Neurologic: Denies headache, loss of consciousness        Vital Signs:   /77   Pulse 91   Ht 165.1 cm (65\")   Wt 102 kg (224 lb)   SpO2 98%   BMI 37.28 kg/m²          Physical Exam  General: Alert. No acute distress    Ortho Exam        LEFT ANKLE Positive EHL, FHL, GS and TA. Sensation intact to all 5 nerves of the foot. Positive pulses. Neurovascularly intact. Calf soft, Non-tender. Mildly full ROM of the ankle with swelling and stability.   Stable to stress. Tender to the lateral aspect of the ankle. Intact flexion and extension of toes. Stiffness with ROM.          Procedures      Imaging Results (Most Recent)       Procedure Component Value Units Date/Time    XR Ankle 3+ View Left [[id number]] Resulted: 04/24/25 0902     Updated: " 04/24/25 0912             Result Review :     X-Ray Report:  Left ankle(s) X-Ray  Indication: Evaluation of the left ankle  AP/Lateral view(s)  Findings: Routine healing of the distal fibular fracture with callous formation needed.    Prior studies available for comparison: yes       XR Ankle 3+ View Left  Result Date: 4/1/2025  Narrative: X-Ray Report: Left ankle(s) X-Ray Indication: Evaluation of the left ankle AP/Lateral view(s) Findings: Mildly displaced distal left fibular fracture with no further displacement from further imaging. Prior studies available for comparison: yes      XR Ankle 3+ View Left  Result Date: 3/27/2025  Narrative: XR ANKLE 3+ VW LEFT Date of Exam: 3/27/2025 9:12 AM EDT Indication: fall, bruising medial/lateral ankles with soft tissue swelling, pain lateral Comparison: None available. Findings: Evaluation of the left ankle demonstrates a comminuted fracture involving the distal fibula which appears minimally displaced. The talar dome is intact. Lateral soft tissue swelling is noted.     Impression: Impression: Mildly displaced distal left fibular fracture with soft tissue swelling. Electronically Signed: Fabi Dupont MD  3/27/2025 9:30 AM EDT  Workstation ID: [id number]             Assessment and Plan     Diagnoses and all orders for this visit:    1. Closed fracture of distal end of left fibula with routine healing, unspecified fracture morphology, subsequent encounter (Primary)  -     XR Ankle 3+ View Left        Discussed the treatment plan with the patient. I reviewed the X-rays that were obtained today with the patient.     Short leg cast removed.  Return to CAM boot.      Work back to WBAT with CAM boot.  Work on strengthening of the left ankle.      Will obtain X-Rays of the left ankle at next visit.    Call or return if worsening symptoms.    Follow Up     3-4 weeks with X ray of the left ankle.       Patient was given instructions and counseling regarding her condition or for  health maintenance advice. Please see specific information pulled into the AVS if appropriate.     Scribed for Tea Jaime MD by Jade Rice MA.  04/24/25   09:15 EDT    I have personally performed the services described in this document as scribed by the above individual and it is both accurate and complete. Tea Jaime MD 04/24/25

## 2025-05-05 DIAGNOSIS — F41.9 ANXIETY: ICD-10-CM

## 2025-05-05 NOTE — TELEPHONE ENCOUNTER
PATIENT HAS APPOINTMENT SCHEDULED FOR 06/25/2025 WITH [facility] WHICH WAS THE FIRST AVAILABLE THAT THE PATIENT COULD COME TO

## 2025-05-15 ENCOUNTER — OFFICE VISIT (OUTPATIENT)
Dept: ORTHOPEDIC SURGERY | Facility: CLINIC | Age: 25
End: 2025-05-15
Payer: COMMERCIAL

## 2025-05-15 VITALS
HEIGHT: 65 IN | DIASTOLIC BLOOD PRESSURE: 75 MMHG | WEIGHT: 224.87 LBS | OXYGEN SATURATION: 98 % | SYSTOLIC BLOOD PRESSURE: 112 MMHG | HEART RATE: 89 BPM | BODY MASS INDEX: 37.47 KG/M2

## 2025-05-15 DIAGNOSIS — M25.572 LEFT ANKLE PAIN, UNSPECIFIED CHRONICITY: Primary | ICD-10-CM

## 2025-05-15 DIAGNOSIS — S82.832D CLOSED FRACTURE OF DISTAL END OF LEFT FIBULA WITH ROUTINE HEALING, UNSPECIFIED FRACTURE MORPHOLOGY, SUBSEQUENT ENCOUNTER: ICD-10-CM

## 2025-05-15 PROCEDURE — 99024 POSTOP FOLLOW-UP VISIT: CPT | Performed by: PHYSICIAN ASSISTANT

## 2025-05-15 NOTE — PROGRESS NOTES
Chief Complaint  Follow-up of the Left Ankle     Subjective      History of Present Illness  The patient is a 24-year-old female who presents for a follow-up on her left ankle. She had a fall on 03/22/2025, resulting in a left distal fibula fracture that was mildly displaced. She had her initial evaluation with Dr. Jaime on 04/01/2025 and was placed in a short leg cast, which was removed at her last visit. At that time, she was still using a knee scooter, but her swelling and bruising had decreased. She was transitioned to a CAM boot and advised to work back to weightbearing as tolerated and begin strengthening the left ankle. She is here today for repeat x-rays.    She presents today with cam boot and scooter.  Reports being able to bear approximately 50 percent of her weight on the affected ankle while wearing the boot, with minimal associated pain while at work.  Primarily only uses the scooter outside of the house.  She notes that her pain has significantly subsided and is almost gone. She has been adhering to the prescribed exercise regimen, performing the exercises three to four times daily.     SOCIAL HISTORY  Occupations: Works in a pharmacy.  Exercise: Performs ankle exercises three to four times a day.      No Known Allergies     Social History     Socioeconomic History    Marital status: Single   Tobacco Use    Smoking status: Never     Passive exposure: Past    Smokeless tobacco: Never   Vaping Use    Vaping status: Never Used   Substance and Sexual Activity    Alcohol use: Never    Drug use: Never    Sexual activity: Yes     Partners: Male     Birth control/protection: Birth control pill        Tobacco Use: Low Risk  (5/15/2025)    Patient History     Smoking Tobacco Use: Never     Smokeless Tobacco Use: Never     Passive Exposure: Past     Patient reports that they are a nonsmoker; cessation education not applicable.     I reviewed the patient's chief complaint, history of present illness, review of  "systems, past medical history, surgical history, family history, social history, medications, and allergy list.     Review of Systems     Constitutional: Denies fevers, chills, weight loss  Cardiovascular: Denies chest pain, shortness of breath  Skin: Denies rashes, acute skin changes  Neurologic: Denies headache, loss of consciousness    Vital Signs:   /75   Pulse 89   Ht 165.1 cm (65\")   Wt 102 kg (224 lb 13.9 oz)   SpO2 98%   BMI 37.42 kg/m²          Physical Exam  General: Alert. No acute distress    Ortho Exam    General: Alert. No acute distress.   Left lower extremity: Mild ankle swelling.  No pain with palpation over the midfoot, hindfoot and forefoot.  No pain with palpation over the medial and lateral malleolus. Calf soft, nontender.  Demonstrates intact active dorsiflexion and plantarflexion of the ankle with trace to mild stiffness. Demonstrates intact active toe flexion and extension with out pain. Sensation intact. Palpable pedal pulse. Less than 2-second capillary refill.     Physical Exam          Imaging Results (Most Recent)       Procedure Component Value Units Date/Time    XR Ankle 3+ View Left [[id number]] Resulted: 05/15/25 0919     Updated: 05/15/25 0919    Narrative:      X-Ray Report:  Study: X-rays ordered, taken in the office, and reviewed today  Indication: follow up left distal fibula fracture  View: AP/Lateral/mortise view(s)  Findings: Interval healing of left distal fibula fracture, most visible   still on lateral films.   Prior studies available for comparison: yes              Result Review :       XR Ankle 3+ View Left  Result Date: 5/15/2025  Narrative: X-Ray Report: Study: X-rays ordered, taken in the office, and reviewed today Indication: follow up left distal fibula fracture View: AP/Lateral/mortise view(s) Findings: Interval healing of left distal fibula fracture, most visible still on lateral films. Prior studies available for comparison: yes     XR Ankle 3+ View " Left  Result Date: 4/24/2025  Narrative: X-Ray Report: Left ankle(s) X-Ray Indication: Evaluation of the left ankle AP/Lateral view(s) Findings: Routine healing of the distal fibular fracture with callous formation needed.  Prior studies available for comparison: yes              Assessment and Plan     Diagnoses and all orders for this visit:    1. Left ankle pain, unspecified chronicity (Primary)  -     XR Ankle 3+ View Left    2. Closed fracture of distal end of left fibula with routine healing, unspecified fracture morphology, subsequent encounter        Assessment & Plan  1. Left distal fibula fracture.  X-rays obtained and reviewed with patient.  These show fracture stable with routine healing.     Continue cam boot and progress weightbearing as tolerated.  She already has lace up ankle braces and she brought 1 with her here today.    If she can progress to full weightbearing with the cam boot without pain after a couple of weeks, she can consider transitioning to an ankle brace just before I see her back.    Avoid high-impact activities such as running, jumping, agility exercises, pivoting, and twisting.     Ice application is recommended for any end-of-day swelling.     Follow up 5 weeks with repeat x-rays.   Call or return if worsening symptoms.      Patient or patient representative verbalized consent for the use of Ambient Listening during the visit with  Ailyn Gordon PA-C for chart documentation. 5/15/2025  12:25 EDT    Follow Up   There are no Patient Instructions on file for this visit.        Patient was given instructions and counseling regarding her condition or for health maintenance advice. Please see specific information pulled into the AVS if appropriate.     Ailyn Gordon PA-C   05/15/2025  12:22 EDT        Dictated Utilizing Dragon Dictation. Please note that portions of this note were completed with a voice recognition program. Part of this note may be an electronic  transcription/translation of spoken language to printed text using the Dragon Dictation System.

## 2025-06-10 ENCOUNTER — TELEPHONE (OUTPATIENT)
Dept: GASTROENTEROLOGY | Facility: CLINIC | Age: 25
End: 2025-06-10
Payer: COMMERCIAL

## 2025-06-19 ENCOUNTER — OFFICE VISIT (OUTPATIENT)
Dept: ORTHOPEDIC SURGERY | Facility: CLINIC | Age: 25
End: 2025-06-19
Payer: COMMERCIAL

## 2025-06-19 VITALS
BODY MASS INDEX: 36.65 KG/M2 | HEART RATE: 90 BPM | HEIGHT: 65 IN | DIASTOLIC BLOOD PRESSURE: 70 MMHG | SYSTOLIC BLOOD PRESSURE: 109 MMHG | WEIGHT: 220 LBS | OXYGEN SATURATION: 96 %

## 2025-06-19 DIAGNOSIS — M25.572 LEFT ANKLE PAIN, UNSPECIFIED CHRONICITY: Primary | ICD-10-CM

## 2025-06-19 DIAGNOSIS — S82.832D CLOSED FRACTURE OF DISTAL END OF LEFT FIBULA WITH ROUTINE HEALING, UNSPECIFIED FRACTURE MORPHOLOGY, SUBSEQUENT ENCOUNTER: ICD-10-CM

## 2025-06-19 NOTE — PROGRESS NOTES
Chief Complaint  Follow-up of the Left Ankle     Subjective      Lyn Darling is a 25 y.o. female who presents to [facility] .    History of Present Illness  The patient is a 25-year-old female who presents for a follow-up on her left ankle. She had a fall on 03/22/2025, resulting in a mildly displaced left distal fibula fracture. At the initial evaluation on 04/01/2025, she was placed in a short leg cast, which has since been transitioned to a CAM boot. At the last visit, she was using the CAM boot and scooter, bearing approximately 50% of her weight. She was advised to continue progressing weight-bearing as tolerated with the CAM boot and to transition into an ankle brace once she achieved full weight-bearing if she felt ready before today's appointment. She is here today for repeat x-rays.    She reports satisfactory progress with her ankle, having attempted ambulation without the boot at home. She has been utilizing a brace and engaging in walking exercises around her residence, without experiencing any pain. She does not report any significant swelling or tenderness in the area.        No Known Allergies     Social History     Socioeconomic History    Marital status: Single   Tobacco Use    Smoking status: Never     Passive exposure: Past    Smokeless tobacco: Never   Vaping Use    Vaping status: Never Used   Substance and Sexual Activity    Alcohol use: Never    Drug use: Never    Sexual activity: Yes     Partners: Male     Birth control/protection: Birth control pill        Tobacco Use: Low Risk  (6/19/2025)    Patient History     Smoking Tobacco Use: Never     Smokeless Tobacco Use: Never     Passive Exposure: Past     Patient reports that they are a nonsmoker; cessation education not applicable.     I reviewed the patient's chief complaint, history of present illness, review of systems, past medical history, surgical history, family history, social history,  "medications, and allergy list.     Review of Systems     Constitutional: Denies fevers, chills, weight loss  Cardiovascular: Denies chest pain, shortness of breath  Skin: Denies rashes, acute skin changes  Neurologic: Denies headache, loss of consciousness    Vital Signs:   /70   Pulse 90   Ht 165.1 cm (65\")   Wt 99.8 kg (220 lb)   SpO2 96%   BMI 36.61 kg/m²          Physical Exam  General: Alert. No acute distress    Ortho Exam      Physical Exam  Integumentary: Skin irritation on the anterior lower shin/upper ankle from the cam boot, but no wound.    Musculoskeletal:  Left ankle: Trace to no swelling, near full range of motion with plantar flexion, dorsiflexion, inversion, and eversion.  Others: Sensation, neurovascularly intact with palpable pulses.      Imaging Results (Most Recent)       Procedure Component Value Units Date/Time    XR Ankle 3+ View Left [[id number]] Resulted: 06/19/25 1129     Updated: 06/19/25 1129    Narrative:      X-Ray Report:  Study: X-rays ordered, taken in the office, and reviewed today  Indication: Follow-up left distal fibula fracture  View: AP/lateral/mortise view(s)  Findings: Interval healing of left distal fibula fracture, fracture line   still best visualized on lateral view.  Prior studies available for comparison: Yes               Result Review :       XR Ankle 3+ View Left  Result Date: 6/19/2025  Narrative: X-Ray Report: Study: X-rays ordered, taken in the office, and reviewed today Indication: Follow-up left distal fibula fracture View: AP/lateral/mortise view(s) Findings: Interval healing of left distal fibula fracture, fracture line still best visualized on lateral view. Prior studies available for comparison: Yes              Assessment and Plan     Diagnoses and all orders for this visit:    1. Left ankle pain, unspecified chronicity (Primary)  -     XR Ankle 3+ View Left    2. Closed fracture of distal end of left fibula with routine healing, unspecified " fracture morphology, subsequent encounter      X-rays obtained and reviewed with patient.  These show fracture stable with routine healing.     Discontinue cam boot.  Transition to the lace up ankle brace.  I would like her to wear this when up and ambulating for significant periods of time for the next week or 2 and then after that she can transition out of it at home but I would like for her to wear this outside of the home or on uneven ground until next evaluation.    Continue with ice and elevation as needed for pain/swelling.      Follow up 6 weeks with repeat x-rays.   Call or return if worsening symptoms.    Assessment & Plan      Patient or patient representative verbalized consent for the use of Ambient Listening during the visit with  Ailyn Gordon PA-C for chart documentation. 6/19/2025  17:02 EDT    Follow Up   There are no Patient Instructions on file for this visit.        Patient was given instructions and counseling regarding her condition or for health maintenance advice. Please see specific information pulled into the AVS if appropriate.     Ailyn Gordon PA-C   06/19/2025  17:01 EDT        Dictated Utilizing Dragon Dictation. Please note that portions of this note were completed with a voice recognition program. Part of this note may be an electronic transcription/translation of spoken language to printed text using the Dragon Dictation System.

## 2025-06-25 ENCOUNTER — OFFICE VISIT (OUTPATIENT)
Dept: FAMILY MEDICINE CLINIC | Facility: CLINIC | Age: 25
End: 2025-06-25
Payer: COMMERCIAL

## 2025-06-25 VITALS
HEART RATE: 98 BPM | WEIGHT: 216 LBS | TEMPERATURE: 97.9 F | SYSTOLIC BLOOD PRESSURE: 122 MMHG | DIASTOLIC BLOOD PRESSURE: 72 MMHG | BODY MASS INDEX: 35.99 KG/M2 | HEIGHT: 65 IN | OXYGEN SATURATION: 99 %

## 2025-06-25 DIAGNOSIS — K51.00 ULCERATIVE PANCOLITIS WITHOUT COMPLICATION: ICD-10-CM

## 2025-06-25 DIAGNOSIS — E55.9 VITAMIN D DEFICIENCY: ICD-10-CM

## 2025-06-25 DIAGNOSIS — F41.9 ANXIETY: ICD-10-CM

## 2025-06-25 DIAGNOSIS — K21.9 GASTROESOPHAGEAL REFLUX DISEASE WITHOUT ESOPHAGITIS: ICD-10-CM

## 2025-06-25 DIAGNOSIS — D50.9 IRON DEFICIENCY ANEMIA, UNSPECIFIED IRON DEFICIENCY ANEMIA TYPE: ICD-10-CM

## 2025-06-25 DIAGNOSIS — Z00.00 ANNUAL PHYSICAL EXAM: Primary | ICD-10-CM

## 2025-06-25 LAB
25(OH)D3 SERPL-MCNC: 18.6 NG/ML (ref 30–100)
ALBUMIN SERPL-MCNC: 3.9 G/DL (ref 3.5–5.2)
ALBUMIN/GLOB SERPL: 1.1 G/DL
ALP SERPL-CCNC: 82 U/L (ref 39–117)
ALT SERPL W P-5'-P-CCNC: 17 U/L (ref 1–33)
ANION GAP SERPL CALCULATED.3IONS-SCNC: 8.6 MMOL/L (ref 5–15)
AST SERPL-CCNC: 23 U/L (ref 1–32)
BASOPHILS # BLD AUTO: 0.01 10*3/MM3 (ref 0–0.2)
BASOPHILS NFR BLD AUTO: 0.1 % (ref 0–1.5)
BILIRUB SERPL-MCNC: 0.7 MG/DL (ref 0–1.2)
BUN SERPL-MCNC: 7 MG/DL (ref 6–20)
BUN/CREAT SERPL: 9.1 (ref 7–25)
CALCIUM SPEC-SCNC: 9.1 MG/DL (ref 8.6–10.5)
CHLORIDE SERPL-SCNC: 107 MMOL/L (ref 98–107)
CO2 SERPL-SCNC: 23.4 MMOL/L (ref 22–29)
CREAT SERPL-MCNC: 0.77 MG/DL (ref 0.57–1)
CRP SERPL-MCNC: 0.87 MG/DL (ref 0–0.5)
DEPRECATED RDW RBC AUTO: 45.5 FL (ref 37–54)
EGFRCR SERPLBLD CKD-EPI 2021: 108.6 ML/MIN/1.73
EOSINOPHIL # BLD AUTO: 0.05 10*3/MM3 (ref 0–0.4)
EOSINOPHIL NFR BLD AUTO: 0.6 % (ref 0.3–6.2)
ERYTHROCYTE [DISTWIDTH] IN BLOOD BY AUTOMATED COUNT: 13 % (ref 12.3–15.4)
ERYTHROCYTE [SEDIMENTATION RATE] IN BLOOD: 10 MM/HR (ref 0–20)
FERRITIN SERPL-MCNC: 44 NG/ML (ref 13–150)
GLOBULIN UR ELPH-MCNC: 3.5 GM/DL
GLUCOSE SERPL-MCNC: 74 MG/DL (ref 65–99)
HCT VFR BLD AUTO: 40.5 % (ref 34–46.6)
HGB BLD-MCNC: 13.7 G/DL (ref 12–15.9)
IMM GRANULOCYTES # BLD AUTO: 0.01 10*3/MM3 (ref 0–0.05)
IMM GRANULOCYTES NFR BLD AUTO: 0.1 % (ref 0–0.5)
IRON 24H UR-MRATE: 90 MCG/DL (ref 37–145)
IRON SATN MFR SERPL: 20 % (ref 20–50)
LYMPHOCYTES # BLD AUTO: 2.27 10*3/MM3 (ref 0.7–3.1)
LYMPHOCYTES NFR BLD AUTO: 29.3 % (ref 19.6–45.3)
MCH RBC QN AUTO: 32.2 PG (ref 26.6–33)
MCHC RBC AUTO-ENTMCNC: 33.8 G/DL (ref 31.5–35.7)
MCV RBC AUTO: 95.1 FL (ref 79–97)
MONOCYTES # BLD AUTO: 0.36 10*3/MM3 (ref 0.1–0.9)
MONOCYTES NFR BLD AUTO: 4.7 % (ref 5–12)
NEUTROPHILS NFR BLD AUTO: 5.04 10*3/MM3 (ref 1.7–7)
NEUTROPHILS NFR BLD AUTO: 65.2 % (ref 42.7–76)
NRBC BLD AUTO-RTO: 0 /100 WBC (ref 0–0.2)
PLATELET # BLD AUTO: 382 10*3/MM3 (ref 140–450)
PMV BLD AUTO: 10.3 FL (ref 6–12)
POTASSIUM SERPL-SCNC: 3.8 MMOL/L (ref 3.5–5.2)
PROT SERPL-MCNC: 7.4 G/DL (ref 6–8.5)
RBC # BLD AUTO: 4.26 10*6/MM3 (ref 3.77–5.28)
SODIUM SERPL-SCNC: 139 MMOL/L (ref 136–145)
TIBC SERPL-MCNC: 441 MCG/DL (ref 298–536)
TRANSFERRIN SERPL-MCNC: 296 MG/DL (ref 200–360)
WBC NRBC COR # BLD AUTO: 7.74 10*3/MM3 (ref 3.4–10.8)

## 2025-06-25 PROCEDURE — 83540 ASSAY OF IRON: CPT | Performed by: NURSE PRACTITIONER

## 2025-06-25 PROCEDURE — 80053 COMPREHEN METABOLIC PANEL: CPT | Performed by: NURSE PRACTITIONER

## 2025-06-25 PROCEDURE — 85652 RBC SED RATE AUTOMATED: CPT

## 2025-06-25 PROCEDURE — 86140 C-REACTIVE PROTEIN: CPT | Performed by: NURSE PRACTITIONER

## 2025-06-25 PROCEDURE — 82306 VITAMIN D 25 HYDROXY: CPT | Performed by: NURSE PRACTITIONER

## 2025-06-25 PROCEDURE — 82728 ASSAY OF FERRITIN: CPT | Performed by: NURSE PRACTITIONER

## 2025-06-25 PROCEDURE — 36415 COLL VENOUS BLD VENIPUNCTURE: CPT | Performed by: NURSE PRACTITIONER

## 2025-06-25 PROCEDURE — 84466 ASSAY OF TRANSFERRIN: CPT | Performed by: NURSE PRACTITIONER

## 2025-06-25 PROCEDURE — 99395 PREV VISIT EST AGE 18-39: CPT | Performed by: NURSE PRACTITIONER

## 2025-06-25 PROCEDURE — 85025 COMPLETE CBC W/AUTO DIFF WBC: CPT

## 2025-06-25 RX ORDER — FAMOTIDINE 20 MG/1
20 TABLET, FILM COATED ORAL
Qty: 30 TABLET | Refills: 5 | Status: SHIPPED | OUTPATIENT
Start: 2025-06-25

## 2025-06-25 NOTE — PROGRESS NOTES
"  ENCOUNTER DATE:  06/25/2025    Lyn Stevenson [facility] / 25 y.o. / female      CHIEF COMPLAINT     Annual Exam and Med Refill      VITALS     Visit Vitals  /72   Pulse 98   Temp 97.9 °F (36.6 °C)   Ht 165.1 cm (65\")   Wt 98 kg (216 lb)   SpO2 99%   BMI 35.94 kg/m²       BP Readings from Last 3 Encounters:   06/25/25 122/72   06/19/25 109/70   05/15/25 112/75     Wt Readings from Last 3 Encounters:   06/25/25 98 kg (216 lb)   06/19/25 99.8 kg (220 lb)   05/15/25 102 kg (224 lb 13.9 oz)      Body mass index is 35.94 kg/m².    MEDICATIONS     Current Outpatient Medications on File Prior to Visit   Medication Sig Dispense Refill    azaTHIOprine (IMURAN) 50 MG tablet Take 4 tablets by mouth Daily. 120 tablet 6    carbonyl iron (FEOSOL) 45 MG tablet tablet Take  by mouth Daily.      Loratadine (CLARITIN PO) Take  by mouth.      mesalamine (APRISO) 0.375 g 24 hr capsule TAKE 4 CAPSULES BY MOUTH EVERY MORNING 120 capsule 11    tiZANidine (ZANAFLEX) 4 MG tablet Take 1 tablet by mouth At Night As Needed for Muscle Spasms. 30 tablet 1    Vienva 0.1-20 MG-MCG per tablet Take 1 tablet by mouth Daily. 84 tablet 1    [DISCONTINUED] famotidine (PEPCID) 20 MG tablet TAKE 1 TABLET BY MOUTH EVERY NIGHT AT BEDTIME. 30 tablet 3    [DISCONTINUED] sertraline (ZOLOFT) 50 MG tablet TAKE 1 TABLET BY MOUTH DAILY. 90 tablet 1    ondansetron (Zofran) 4 MG tablet Take 1 tablet by mouth Every 8 (Eight) Hours As Needed for Nausea or Vomiting. (Patient not taking: Reported on 6/25/2025) 30 tablet 2    Sod Picosulfate-Mag Ox-Cit Acd (Clenpiq) 10-3.5-12 MG-GM -GM/175ML solution Take 175 mL by mouth Take As Directed. Please follow colon prep instructions provided by office. (Patient not taking: Reported on 6/25/2025) 175 mL 0     No current facility-administered medications on file prior to visit.         HISTORY OF PRESENT ILLNESS     Lyn presents for annual health maintenance visit.  Lab Results   Component Value Date    HPVAPTIMA Negative " 11/07/2022      Last health maintenance visit: approximately 1 year ago  General health: good  Lifestyle:  Attempting to lose weight?: Yes   Diet: eats a well balanced, healthy diet  Exercise: generally stays active (work/exercise)  Tobacco: Never used   Alcohol: does not drink  Work: Full-time  Reproductive health:  Sexually active?: Yes   Sexual problems?: No problems  Concern for STD?: No    Sees Gynecologist?: Yes   Ignacia/Postmenopausal?: No   Domestic abuse concerns: No   Depression Screening:          PHQ-9 Depression Screening  Little interest or pleasure in doing things? Not at all   Feeling down, depressed, or hopeless? Not at all   PHQ-2 Total Score 0   Trouble falling or staying asleep, or sleeping too much?     Feeling tired or having little energy?     Poor appetite or overeating?     Feeling bad about yourself - or that you are a failure or have let yourself or your family down?     Trouble concentrating on things, such as reading the newspaper or watching television?     Moving or speaking so slowly that other people could have noticed? Or the opposite - being so fidgety or restless that you have been moving around a lot more than usual?     Thoughts that you would be better off dead, or of hurting yourself in some way?     PHQ-9 Total Score     If you checked off any problems, how difficult have these problems made it for you to do your work, take care of things at home, or get along with other people?         IAN-7           Patient Care Team:  Joyce Rodriguez APRN as PCP - General (Family Medicine)  Leno Guzman MD as Consulting Physician (Gastroenterology)  Denisha Bazan APRN as Nurse Practitioner (Gastroenterology)      ALLERGIES  No Known Allergies     PFSH:     The following portions of the patient's history were reviewed and updated as appropriate: Allergies / Current Medications / Past Medical History / Surgical History / Social History / Family History    PROBLEM LIST    Patient Active Problem List   Diagnosis    Anemia    Anxiety    Eczema    Iron deficiency anemia    Ulcerative colitis    Kidney stones    Class 2 obesity due to excess calories without serious comorbidity with body mass index (BMI) of 38.0 to 38.9 in adult    Chronic pancolonic ulcerative colitis    Gastroesophageal reflux disease       PAST MEDICAL HISTORY  Past Medical History:   Diagnosis Date    Anxiety 12/11/2020    Eczema     Iron deficiency anemia, unspecified 12/11/2020    Kidney stones     Ulcerative colitis 12/11/2020       SURGICAL HISTORY  Past Surgical History:   Procedure Laterality Date    COLONOSCOPY  2015 2021    COLONOSCOPY N/A 9/25/2023    Procedure: COLONOSCOPY with biopsy;  Surgeon: Leno Guzman MD;  Location: [facility];  Service: Gastroenterology;  Laterality: N/A;  colitis mild to moderate to the entire colon    ENDOSCOPY N/A 9/25/2023    Procedure: ESOPHAGOGASTRODUODENOSCOPY with biopsy;  Surgeon: Leno Guzman MD;  Location: [facility] ENDOSCOPY;  Service: Gastroenterology;  Laterality: N/A;  hiattal hernia    WISDOM TOOTH EXTRACTION N/A        SOCIAL HISTORY  Social History     Socioeconomic History    Marital status: Single   Tobacco Use    Smoking status: Never     Passive exposure: Past    Smokeless tobacco: Never   Vaping Use    Vaping status: Every Day    Substances: THC   Substance and Sexual Activity    Alcohol use: Never    Drug use: Never    Sexual activity: Yes     Partners: Male     Birth control/protection: Birth control pill       FAMILY HISTORY  Family History   Problem Relation Age of Onset    Breast cancer Maternal Grandmother     Colon cancer Maternal Grandfather     Deep vein thrombosis Neg Hx     Pulmonary embolism Neg Hx     Prostate cancer Neg Hx     Melanoma Neg Hx     Ovarian cancer Neg Hx     Uterine cancer Neg Hx     Esophageal cancer Neg Hx        IMMUNIZATION HISTORY  Immunization History   Administered Date(s) Administered    COVID-19  (PFIZER) Purple Cap Monovalent 09/17/2021, 10/08/2021    DTaP 1998, 1998, 1998, 03/21/2000, 05/14/2003    Hep B, Adolescent or Pediatric 1998, 1998, 04/10/1999    Hib (PRP-OMP) 1998, 1998, 1998, 03/21/2000    IPV 1998, 1998, 03/21/2000, 05/14/2003    MMR 08/03/1999, 05/14/2003    Meningococcal MCV4P (Menactra) 06/04/2009    Tdap 06/04/2009, 08/01/2022    Varicella 08/03/1999         HPI    History of Present Illness  The patient presents for a checkup and medication refills.    She reports no current health concerns. Her lifestyle includes a balanced diet, although she does not engage in regular exercise. However, her occupation at a pharmacy involves significant physical activity, including walking and running. She does not use tobacco, alcohol, or illicit substances. She underwent a Pap smear in 02/2024 and performs self-breast examinations. There is a family history of breast cancer (maternal grandmother) and colon cancer (maternal grandfather). She has a history of ulcerative colitis, which is currently in remission. Additionally, she has a past history of anemia, diagnosed concurrently with her colitis, but she is no longer anemic. She reports no regular leg or foot swelling but mentions recent ankle swelling due to a fracture. She is requesting refills for Zoloft, FeroSul, Claritin, and Pepcid. She recently started taking over-the-counter vitamin D supplements.    SOCIAL HISTORY  She does not use tobacco, alcohol, or illicit substances.    FAMILY HISTORY  Her maternal grandmother had breast cancer.  Her maternal grandfather had colon cancer.  Her father had diabetes.  She reports no family history of thyroid disorder.         Physical Exam  Vitals reviewed.   Constitutional:       General: She is not in acute distress.     Appearance: Normal appearance. She is well-developed. She is obese. She is not ill-appearing.   HENT:      Head: Normocephalic and  "atraumatic.   Eyes:      Conjunctiva/sclera: Conjunctivae normal.      Pupils: Pupils are equal, round, and reactive to light.   Cardiovascular:      Rate and Rhythm: Normal rate and regular rhythm.      Heart sounds: No murmur heard.  Pulmonary:      Effort: Pulmonary effort is normal.      Breath sounds: Normal breath sounds. No wheezing.   Skin:     General: Skin is warm and dry.   Neurological:      Mental Status: She is alert and oriented to person, place, and time.   Psychiatric:         Mood and Affect: Mood and affect normal.         Behavior: Behavior normal.         Thought Content: Thought content normal.         Judgment: Judgment normal.         REVIEWED DATA      Labs:    Lab Results   Component Value Date     11/02/2024    K 4.0 11/02/2024    CALCIUM 8.7 11/02/2024    AST 15 11/02/2024    ALT 17 11/02/2024    BUN 7 11/02/2024    CREATININE 0.63 11/02/2024    CREATININE 0.69 03/09/2024    CREATININE 0.62 12/02/2023       Lab Results   Component Value Date    TSH 2.890 11/12/2022    FREET4 1.2 12/11/2020       No results found for: \"LDL\", \"HDL\", \"TRIG\", \"CHOLHDLRATIO\"    No results found for: \"NQWN48XC\"     Lab Results   Component Value Date    WBC 7.25 11/02/2024    HGB 13.4 11/02/2024    MCV 95.4 11/02/2024     11/02/2024       No results found for: \"PROTEIN\", \"GLUCOSEU\", \"BLOODU\", \"NITRITEU\", \"LEUKOCYTESUR\"     No results found for: \"HEPCVIRUSABY\"        ASSESSMENT & PLAN     Diagnoses and all orders for this visit:    1. Annual physical exam (Primary)  -     Cancel: CBC & Differential  -     Cancel: Comprehensive Metabolic Panel    2. Gastroesophageal reflux disease without esophagitis  -     famotidine (PEPCID) 20 MG tablet; Take 1 tablet by mouth every night at bedtime.  Dispense: 30 tablet; Refill: 5    3. Anxiety  -     sertraline (ZOLOFT) 50 MG tablet; Take 1 tablet by mouth Daily.  Dispense: 90 tablet; Refill: 1    4. Vitamin D deficiency  -     Vitamin D,25-Hydroxy    5. Iron " deficiency anemia, unspecified iron deficiency anemia type  -     Iron Profile w/o Ferritin  -     Ferritin    6. Ulcerative pancolitis without complication  -     CBC & Differential  -     Comprehensive Metabolic Panel  -     Sedimentation Rate  -     C-reactive Protein        Assessment & Plan  1. Health maintenance.  - Last Pap smear conducted in 02/2024 with normal results.   - Most recent laboratory tests performed in 03/2024.  - History of anemia associated with ulcerative colitis diagnosis, currently not anemic.  - Basic laboratory tests, including CMP to assess liver enzymes and kidney function, will be ordered. Results will be communicated upon receipt.    2. Ulcerative colitis.  - Ulcerative colitis currently in remission.  - Undergoes annual colonoscopies due to condition.  - No current symptoms reported.    3. Medication management.  - Prescriptions for Zoloft, FeroSul, Claritin, and Pepcid renewed and sent to pharmacy.  - Prefers to process as a 30-day supply despite 90-day prescription.  - Continues to take over-the-counter medications as needed.         HEALTHCARE MAINTENANCE ISSUES     Cancer Screening:  Colon: Initial/Next screening at age: 45  Repeat colon cancer screening: N/A at this time  Breast: Recommended monthly self exams; annual professional exam  Mammogram: N/A at this time  Cervical: pelvic exam as recommended by gyne  Skin: Monthly self skin examination, annual exam by health professional      Lifestyle Counseling:  Lifestyle Modifications: Attempt to lose weight, Continue good lifestyle choices/modifications, and Improve dietary compliance  Safety Issues: Always wear seatbelt, Avoid texting while driving   Use sunscreen, regular skin examination  Recommended annual dental/vision examination.  Emotional/Stress/Sleep: Reviewed and  given when appropriate    Part of this note may be electronic transcription/translation of spoken language to printed text using the Dragon dictation  system      Patient or patient representative verbalized consent for the use of Ambient Listening during the visit with  CELIA Eller for chart documentation. 6/25/2025  15:32 EDT

## 2025-07-08 NOTE — PROGRESS NOTES
"Well Woman Visit    CC: Scheduled annual well gyn visit  Chief Complaint   Patient presents with    Annual Exam         Contraception:  Birth control pill    Last Completed Pap Smear            Upcoming       PAP SMEAR (Every 3 Years) Next due on 2024  IGP,rfx Aptima HPV All Pth    2022  IgP, Aptima HPV    2021  SCANNED - PAP SMEAR                           Last Completed Mammogram    This patient has no relevant Health Maintenance data.          HPI:   25 y.o.     History of Present Illness  The patient presents for an annual exam.    She reports no current health issues or concerns. She continues to use her prescribed birth control pill. She is a non-smoker. Her ulcerative colitis is well-managed at present.    SOCIAL HISTORY  She does not smoke.       Menses:   q 28 days, lasts 5 days, changes products q 4-5hrs on heaviest days.   Pain:  None    PCP: does manage PMHx and preventative labs  History: PMHx, Meds, Allergies, PSHx, Social Hx, and POBHx all reviewed and updated.        PHYSICAL EXAM:  /74   Pulse 81   Ht 165.1 cm (65\")   Wt 98.4 kg (217 lb)   LMP 2025 (Exact Date)   Breastfeeding No   BMI 36.11 kg/m²  Not found.  General- NAD, alert and oriented, appropriate  Psych- Normal mood, good memory  Neck- No masses, no thyroid enlargement  CV- Regular rhythm, no murnurs  Resp- CTA to bases, no wheezes  Abdomen- Soft, non distended, non tender, no masses  Breast left-  Bilaterally symmetrical, no masses, non tender, no nipple discharge  Breast right- Bilaterally symmetrical, no masses, non tender, no nipple discharge  External genitalia- Normal female, no lesions  Urethra/meatus- Normal, no masses, non tender  Bladder- Normal, no masses, non tender  Vagina- Normal, no atrophy, no lesions, no discharge.    Cvx- Normal, no lesions, no discharge, No cervical motion tenderness  Uterus- Normal size, shape & consistency.  Non tender, mobile.  Adnexa- No mass, " non tender  Anus/Rectum/Perineum- Not performed  Lymphatic- No palpable neck, axillary, or groin nodes  Ext- No edema, no cyanosis    Skin- No lesions, no rashes, no acanthosis nigricans      ASSESSMENT and PLAN:    Diagnoses and all orders for this visit:    1. Well woman exam with routine gynecological exam (Primary)    2. Encounter for surveillance of contraceptive pills  -     Vienva 0.1-20 MG-MCG per tablet; Take 1 tablet by mouth Daily.  Dispense: 84 tablet; Refill: 1        Assessment & Plan  1. Annual exam.  - Blood pressure readings are within the normal range. Pap smear is up to date. Pelvic exam was performed.  - Current medications have been refilled.    Follow-up: The patient will follow up in 1 year.       Preventative:  CERVICAL CANCER Screening- Reviewed current ASCCP guidelines for screening w and wo cotest HPV, age specific.  Screen: Already up to date  Follow up PCP/Specialist PMHx and/or Labs      She understands the importance of having any ordered tests to be performed in a timely fashion.  The risks of not performing them include, but are not limited to, advanced cancer stages, bone loss from osteoporosis and/or subsequent increase in morbidity and/or mortality.  She is encouraged to review her results online and/or contact or office if she has questions.     Follow Up:  Return in about 1 year (around 7/9/2026) for Annual physical.            Sheree Llamas MD  07/09/2025    [facility] [address] [place] [address]  Dept: [phone]  Dept Fax: [phone]  Loc: [phone]  Loc Fax: [phone]     Patient or patient representative verbalized consent for the use of Ambient Listening during the visit with  Sheree Llamas MD for chart documentation. 7/9/2025  11:24 EDT

## 2025-07-09 ENCOUNTER — OFFICE VISIT (OUTPATIENT)
Dept: OBSTETRICS AND GYNECOLOGY | Age: 25
End: 2025-07-09
Payer: COMMERCIAL

## 2025-07-09 VITALS
SYSTOLIC BLOOD PRESSURE: 110 MMHG | HEART RATE: 81 BPM | WEIGHT: 217 LBS | HEIGHT: 65 IN | BODY MASS INDEX: 36.15 KG/M2 | DIASTOLIC BLOOD PRESSURE: 74 MMHG

## 2025-07-09 DIAGNOSIS — Z01.419 WELL WOMAN EXAM WITH ROUTINE GYNECOLOGICAL EXAM: Primary | ICD-10-CM

## 2025-07-09 DIAGNOSIS — Z30.41 ENCOUNTER FOR SURVEILLANCE OF CONTRACEPTIVE PILLS: ICD-10-CM

## 2025-07-09 RX ORDER — TIMOLOL MALEATE 5 MG/ML
1 SOLUTION/ DROPS OPHTHALMIC DAILY
Qty: 84 TABLET | Refills: 1 | Status: SHIPPED | OUTPATIENT
Start: 2025-07-09

## 2025-07-25 DIAGNOSIS — K51.011 ULCERATIVE PANCOLITIS WITH RECTAL BLEEDING: ICD-10-CM

## 2025-07-25 DIAGNOSIS — K51.00 ULCERATIVE PANCOLITIS WITHOUT COMPLICATION: Primary | ICD-10-CM

## 2025-07-28 RX ORDER — AZATHIOPRINE 50 MG/1
200 TABLET ORAL DAILY
Qty: 120 TABLET | Refills: 6 | Status: SHIPPED | OUTPATIENT
Start: 2025-07-28

## 2025-07-28 NOTE — TELEPHONE ENCOUNTER
Refill request.    Medication Requested Azathioprine    Last Refill 10/1/2024    Last OV 4/1/25- per office note CELIA Smith -Patient continues Imuran 100 mg and Apriso 4 tablets daily with adequate control of ulcerative colitis     Next OV 10/14/25 scope scheduled 9/8/25    Medication pended for approval and correct pharmacy verified Yes

## 2025-07-31 ENCOUNTER — OFFICE VISIT (OUTPATIENT)
Dept: ORTHOPEDIC SURGERY | Facility: CLINIC | Age: 25
End: 2025-07-31
Payer: COMMERCIAL

## 2025-07-31 VITALS
BODY MASS INDEX: 36.14 KG/M2 | WEIGHT: 216.93 LBS | DIASTOLIC BLOOD PRESSURE: 81 MMHG | OXYGEN SATURATION: 98 % | HEART RATE: 97 BPM | SYSTOLIC BLOOD PRESSURE: 114 MMHG | HEIGHT: 65 IN

## 2025-07-31 DIAGNOSIS — M25.572 LEFT ANKLE PAIN, UNSPECIFIED CHRONICITY: Primary | ICD-10-CM

## 2025-07-31 DIAGNOSIS — S82.832D CLOSED FRACTURE OF DISTAL END OF LEFT FIBULA WITH ROUTINE HEALING, UNSPECIFIED FRACTURE MORPHOLOGY, SUBSEQUENT ENCOUNTER: ICD-10-CM

## 2025-08-01 NOTE — PROGRESS NOTES
Chief Complaint  Follow-up of the Left Ankle     Subjective        History of Present Illness  The patient is a 25-year-old female who presents today to follow up on her left ankle. She had a fall on 03/22/2025 resulting in a mildly displaced left distal fibula fracture. She was placed in a short leg cast at initial evaluation on 04/01/2025, which she has since been removed from and transitioned to a CAM boot and now a lace-up ankle brace. She is here today for repeat x-rays and to consider discharge.    She reports that her ankle is in good condition, with no pain or swelling. She has been able to walk without the brace at home but continues to use it during work hours. She has been performing ankle stretches and strengthening exercises and has not experienced any new twists or injuries.    SOCIAL HISTORY  Occupations: Wears a brace at work      No Known Allergies     Social History     Socioeconomic History    Marital status: Single   Tobacco Use    Smoking status: Never     Passive exposure: Past    Smokeless tobacco: Never   Vaping Use    Vaping status: Every Day    Substances: THC    Devices: Disposable   Substance and Sexual Activity    Alcohol use: Never    Drug use: Never    Sexual activity: Yes     Partners: Male     Birth control/protection: Birth control pill        Tobacco Use: Low Risk  (7/31/2025)    Patient History     Smoking Tobacco Use: Never     Smokeless Tobacco Use: Never     Passive Exposure: Past     Patient reports that they are a nonsmoker; cessation education not applicable.     I reviewed the patient's chief complaint, history of present illness, review of systems, past medical history, surgical history, family history, social history, medications, and allergy list.     Review of Systems     Constitutional: Denies fevers, chills, weight loss  Cardiovascular: Denies chest pain, shortness of breath  Skin: Denies rashes, acute skin changes  Neurologic: Denies headache, loss of  "consciousness    Vital Signs:   /81   Pulse 97   Ht 165.1 cm (65\")   Wt 98.4 kg (216 lb 14.9 oz)   SpO2 98%   BMI 36.10 kg/m²          Physical Exam  General: Alert. No acute distress    Ortho Exam    Left ankle: No gross bony abnormality.  Patient demonstrates full range of motion of the ankle without pain.  Nontender over distal radius and ulna.  Nontender over the midfoot forefoot or hindfoot.  She is able to wiggle the toes.  Sensation neurovascularly intact with palpable pulses distally.  Physical Exam        Imaging Results (Most Recent)       Procedure Component Value Units Date/Time    XR Ankle 3+ View Left - Preliminary [[id number]] Resulted: 07/31/25 1034     Updated: 07/31/25 1034    This result has not been signed. Information might be incomplete.      Narrative:      X-Ray Report:  Study: X-rays ordered, taken in the office, and reviewed today  Indication: Follow-up left distal fibula fracture  View: AP/Lateral/mortise view(s)  Findings: Interval healing of left distal fibula fracture  Prior studies available for comparison: yes               Result Review :       XR Ankle 3+ View Left  Result Date: 7/31/2025  Narrative: X-Ray Report: Study: X-rays ordered, taken in the office, and reviewed today Indication: Follow-up left distal fibula fracture View: AP/Lateral/mortise view(s) Findings: Interval healing of left distal fibula fracture Prior studies available for comparison: yes              Assessment and Plan     Diagnoses and all orders for this visit:    1. Left ankle pain, unspecified chronicity (Primary)  -     XR Ankle 3+ View Left    2. Closed fracture of distal end of left fibula with routine healing, unspecified fracture morphology, subsequent encounter        Assessment & Plan  1. Left distal fibula fracture:  Significant improvement is noted in the left distal fibula fracture, with near healing noted on x-ray. No pain or swelling is reported in the ankle, and mobility is good. The " brace is worn at work but removed at home. Ankle stretches and strengthening exercises are being performed. High-impact activities such as running, jumping, or burpees should be avoided another 6 weeks or so and then gradual increase in activity.    Progress activity as tolerated.    Patient will follow-up as needed.    Patient or patient representative verbalized consent for the use of Ambient Listening during the visit with  Ailyn Gordon PA-C for chart documentation. 8/1/2025  09:27 EDT    Follow Up   There are no Patient Instructions on file for this visit.        Patient was given instructions and counseling regarding her condition or for health maintenance advice. Please see specific information pulled into the AVS if appropriate.     Ailyn Gordon PA-C   07/31/2025  09:25 EDT        Dictated Utilizing Dragon Dictation. Please note that portions of this note were completed with a voice recognition program. Part of this note may be an electronic transcription/translation of spoken language to printed text using the Dragon Dictation System.

## 2025-08-12 ENCOUNTER — OFFICE VISIT (OUTPATIENT)
Dept: PODIATRY | Facility: CLINIC | Age: 25
End: 2025-08-12
Payer: COMMERCIAL

## 2025-08-12 VITALS
WEIGHT: 214 LBS | BODY MASS INDEX: 35.65 KG/M2 | SYSTOLIC BLOOD PRESSURE: 109 MMHG | DIASTOLIC BLOOD PRESSURE: 71 MMHG | HEIGHT: 65 IN | HEART RATE: 108 BPM | TEMPERATURE: 97.8 F | OXYGEN SATURATION: 98 %

## 2025-08-12 DIAGNOSIS — L03.032 PARONYCHIA OF TOE OF LEFT FOOT: ICD-10-CM

## 2025-08-12 DIAGNOSIS — M79.671 FOOT PAIN, RIGHT: ICD-10-CM

## 2025-08-12 DIAGNOSIS — L03.031 PARONYCHIA OF TOE OF RIGHT FOOT: ICD-10-CM

## 2025-08-12 DIAGNOSIS — M79.672 FOOT PAIN, LEFT: Primary | ICD-10-CM

## 2025-08-12 DIAGNOSIS — L60.0 ONYCHOCRYPTOSIS: ICD-10-CM

## 2025-08-26 ENCOUNTER — OFFICE VISIT (OUTPATIENT)
Dept: PODIATRY | Facility: CLINIC | Age: 25
End: 2025-08-26
Payer: COMMERCIAL

## 2025-08-26 VITALS
HEIGHT: 65 IN | WEIGHT: 217 LBS | DIASTOLIC BLOOD PRESSURE: 71 MMHG | TEMPERATURE: 97.8 F | OXYGEN SATURATION: 97 % | SYSTOLIC BLOOD PRESSURE: 106 MMHG | HEART RATE: 94 BPM | BODY MASS INDEX: 36.15 KG/M2

## 2025-08-26 DIAGNOSIS — L03.032 PARONYCHIA OF TOE OF LEFT FOOT: ICD-10-CM

## 2025-08-26 DIAGNOSIS — L60.0 ONYCHOCRYPTOSIS: ICD-10-CM

## 2025-08-26 DIAGNOSIS — M79.672 FOOT PAIN, LEFT: Primary | ICD-10-CM

## 2025-08-26 DIAGNOSIS — M79.671 FOOT PAIN, RIGHT: ICD-10-CM

## 2025-08-26 DIAGNOSIS — L03.031 PARONYCHIA OF TOE OF RIGHT FOOT: ICD-10-CM

## 2025-08-29 ENCOUNTER — TELEPHONE (OUTPATIENT)
Dept: GASTROENTEROLOGY | Facility: CLINIC | Age: 25
End: 2025-08-29
Payer: COMMERCIAL

## (undated) DEVICE — BLCK/BITE BLOX WO/DENTL/RIM W/STRAP 54F

## (undated) DEVICE — Device: Brand: DEFENDO AIR/WATER/SUCTION AND BIOPSY VALVE

## (undated) DEVICE — SOL IRRG H2O PL/BG 1000ML STRL

## (undated) DEVICE — LINER SURG CANSTR SXN S/RIGD 1500CC

## (undated) DEVICE — SOLIDIFIER LIQLOC PLS 1500CC BT

## (undated) DEVICE — Device

## (undated) DEVICE — CONN JET HYDRA H20 AUXILIARY DISP

## (undated) DEVICE — SINGLE-USE BIOPSY FORCEPS: Brand: RADIAL JAW 4